# Patient Record
Sex: MALE | Race: WHITE | Employment: OTHER | URBAN - METROPOLITAN AREA
[De-identification: names, ages, dates, MRNs, and addresses within clinical notes are randomized per-mention and may not be internally consistent; named-entity substitution may affect disease eponyms.]

---

## 2023-08-22 ENCOUNTER — TELEPHONE (OUTPATIENT)
Dept: ORTHOPEDIC SURGERY | Age: 68
End: 2023-08-22

## 2023-08-22 ENCOUNTER — OFFICE VISIT (OUTPATIENT)
Dept: ORTHOPEDIC SURGERY | Age: 68
End: 2023-08-22
Payer: MEDICARE

## 2023-08-22 VITALS — WEIGHT: 247.8 LBS | HEIGHT: 74 IN | BODY MASS INDEX: 31.8 KG/M2

## 2023-08-22 DIAGNOSIS — M25.551 RIGHT HIP PAIN: Primary | ICD-10-CM

## 2023-08-22 PROCEDURE — G8417 CALC BMI ABV UP PARAM F/U: HCPCS | Performed by: ORTHOPAEDIC SURGERY

## 2023-08-22 PROCEDURE — 3017F COLORECTAL CA SCREEN DOC REV: CPT | Performed by: ORTHOPAEDIC SURGERY

## 2023-08-22 PROCEDURE — 99203 OFFICE O/P NEW LOW 30 MIN: CPT | Performed by: ORTHOPAEDIC SURGERY

## 2023-08-22 PROCEDURE — G8428 CUR MEDS NOT DOCUMENT: HCPCS | Performed by: ORTHOPAEDIC SURGERY

## 2023-08-22 PROCEDURE — 1123F ACP DISCUSS/DSCN MKR DOCD: CPT | Performed by: ORTHOPAEDIC SURGERY

## 2023-08-22 PROCEDURE — 4004F PT TOBACCO SCREEN RCVD TLK: CPT | Performed by: ORTHOPAEDIC SURGERY

## 2023-08-22 RX ORDER — MELOXICAM 15 MG/1
15 TABLET ORAL DAILY
Qty: 30 TABLET | Refills: 2 | Status: SHIPPED | OUTPATIENT
Start: 2023-08-22

## 2023-08-22 RX ORDER — OMEPRAZOLE 40 MG/1
40 CAPSULE, DELAYED RELEASE ORAL DAILY
Qty: 30 CAPSULE | Refills: 0 | Status: SHIPPED | OUTPATIENT
Start: 2023-08-22

## 2023-08-22 NOTE — TELEPHONE ENCOUNTER
She says there was no mention of sending in Omeprazole. Can you let them know why he needs that. She says he does not have any stomach issues.

## 2023-08-22 NOTE — PROGRESS NOTES
Patient ID:    Cedric Moncada  [id number]  34 y.o.  1955    Today: August 22, 2023          Chief Complaint:  right hip pain        Patient reports longstanding history of right hip pain. The patient reports predominately posterior buttock pain. Patient does not report significant anterior groin pain. In addition, the patient reports little if any problems in putting on socks/shoes and entering/exiting a vehicle. In addition, the patient reports little, if any, pain while laying on the hip. The patient does not report some associated numbness/tingling in the leg(s). Patient has attempted prior conservative treatment including OTC Aleve and tylenol. .        Past Medical History:  Past Medical History:   Diagnosis Date    Chest pain, unspecified     Gout     HTN (hypertension)     Sleep apnea        Past Surgical History:  Past Surgical History:   Procedure Laterality Date    CORONARY ANGIOPLASTY WITH STENT PLACEMENT      PROSTATECTOMY          Medications:     Prior to Admission medications    Medication Sig Start Date End Date Taking?  Authorizing Provider   meloxicam (MOBIC) 15 MG tablet Take 1 tablet by mouth daily 6/28/22  Yes Fran Owens MD   omeprazole (PRILOSEC) 40 MG delayed release capsule Take 1 capsule by mouth daily 6/28/22  Yes Fran Owens MD   amLODIPine (NORVASC) 2.5 MG tablet  4/12/22   Historical Provider, MD   ascorbic acid (VITAMIN C) 500 MG tablet Take by mouth    Ar Automatic Reconciliation   aspirin 81 MG EC tablet Take by mouth daily    Ar Automatic Reconciliation   atenolol (TENORMIN) 25 MG tablet Take 25 mg by mouth daily 12/15/16   Ar Automatic Reconciliation   atorvastatin (LIPITOR) 80 MG tablet Take 80 mg by mouth daily 2/23/18   Ar Automatic Reconciliation   Cetirizine HCl (ZYRTEC ALLERGY) 10 MG CAPS Take by mouth as needed    Ar Automatic Reconciliation   Cholecalciferol 50 MCG (2000 UT) TABS Take by mouth    Ar Automatic Reconciliation   coenzyme Q10 100 MG CAPS

## 2023-08-23 ENCOUNTER — TELEPHONE (OUTPATIENT)
Dept: ORTHOPEDIC SURGERY | Age: 68
End: 2023-08-23

## 2023-08-23 NOTE — TELEPHONE ENCOUNTER
His wife is calling regarding his visit yesterday. The medications that were pulled from his [facility] are not correct. Please call.

## 2023-08-25 ENCOUNTER — TELEPHONE (OUTPATIENT)
Dept: ORTHOPEDIC SURGERY | Age: 68
End: 2023-08-25

## 2023-08-25 NOTE — TELEPHONE ENCOUNTER
She left a message a few days ago and has not gotten a return call. She says there are some things on his record that are incorrect. She is asking again for a return call.

## 2023-08-28 ENCOUNTER — TELEPHONE (OUTPATIENT)
Dept: ORTHOPEDIC SURGERY | Age: 68
End: 2023-08-28

## 2023-08-31 ENCOUNTER — TELEPHONE (OUTPATIENT)
Dept: ORTHOPEDIC SURGERY | Age: 68
End: 2023-08-31

## 2023-08-31 NOTE — TELEPHONE ENCOUNTER
VM left on patient's phone to follow up with issues between medical records. Pt returned call about 2 minutes later and asked that I speak with his wife as she knows more about the situation. She will return the call later today.

## 2023-08-31 NOTE — TELEPHONE ENCOUNTER
Spoke with pt's wife, Izzy Patel, regarding issue with chart linking incorrectly. She stated that medications were listed that pt was not taking including: cholecalciferol, zyrtec, atorvastatin, atenolol, vitamin C, amlodipine and he did not have coronary angioplasty with stent placement nor prostatectomy surgeries. Explained that we will get corrections made and notify her when completed.  Maria R's phone #[phone]

## 2023-09-08 ENCOUNTER — TELEPHONE (OUTPATIENT)
Dept: ORTHOPEDIC SURGERY | Age: 68
End: 2023-09-08

## 2023-09-08 NOTE — TELEPHONE ENCOUNTER
Spoke with pt's wife, Eleanor Blend, re: Epic chart issues. Gave her the [facility] phone number to resolve any remaining issues. She stated that the inaccurate meds and surgical history is located on Dr. Leandra Estrada 8/22/23 note. Will submit to HIM/Privacy for correction.

## 2023-09-08 NOTE — TELEPHONE ENCOUNTER
Spoke with wife, Em Bose, to notify her that she will receive a form in the mail from HIM dept to request correction of Office Visit note dated 8/22/2023 per her request.

## 2024-06-13 ENCOUNTER — HOSPITAL ENCOUNTER (EMERGENCY)
Age: 69
Discharge: HOME | End: 2024-06-13
Payer: MEDICARE

## 2024-06-13 VITALS
DIASTOLIC BLOOD PRESSURE: 80 MMHG | OXYGEN SATURATION: 95 % | HEIGHT: 74 IN | BODY MASS INDEX: 31.44 KG/M2 | SYSTOLIC BLOOD PRESSURE: 150 MMHG | RESPIRATION RATE: 17 BRPM | HEART RATE: 91 BPM | WEIGHT: 245 LBS | TEMPERATURE: 98.5 F

## 2024-06-13 DIAGNOSIS — R04.0 EPISTAXIS: Primary | ICD-10-CM

## 2024-06-13 PROCEDURE — 99283 EMERGENCY DEPT VISIT LOW MDM: CPT

## 2024-06-13 PROCEDURE — 6370000000 HC RX 637 (ALT 250 FOR IP): Performed by: PHYSICIAN ASSISTANT

## 2024-06-13 RX ORDER — OXYMETAZOLINE HYDROCHLORIDE 0.05 G/100ML
2 SPRAY NASAL
Status: COMPLETED | OUTPATIENT
Start: 2024-06-13 | End: 2024-06-13

## 2024-06-13 RX ADMIN — OXYMETAZOLINE HCL 2 SPRAY: 0.05 SPRAY NASAL at 22:19

## 2024-06-13 ASSESSMENT — LIFESTYLE VARIABLES
HOW OFTEN DO YOU HAVE A DRINK CONTAINING ALCOHOL: NEVER
HOW MANY STANDARD DRINKS CONTAINING ALCOHOL DO YOU HAVE ON A TYPICAL DAY: PATIENT DOES NOT DRINK

## 2024-06-14 NOTE — ED NOTES
Patient mobility status  with no difficulty. Provider aware     I have reviewed discharge instructions with the patient.  The patient verbalized understanding.    Patient left ED via Discharge Method: ambulatory to Home with Spouse.    Opportunity for questions and clarification provided.     Patient given 0 scripts.           Reshma Obregon LPN  06/13/24 [id number]

## 2024-06-14 NOTE — ED TRIAGE NOTES
Here for nosebleed, started approx 1 hr pta. Pt takes xarelto. Bleeding appears controlled in triage. Denies posterior drainage. Currently experiencing cold-like sx, nasal congestion.

## 2024-06-14 NOTE — DISCHARGE INSTRUCTIONS
2 sprays in each nostril if the nosebleed returns.  Then placed a nasal clamp in place.  Follow-up with ear nose and throat.  I have sent in referral on your behalf.

## 2024-06-14 NOTE — ED PROVIDER NOTES
Emergency Department Provider Note       PCP: Candy Diaz MD   Age: 68 y.o.   Sex: male     DISPOSITION       No diagnosis found.  Medical Decision Making     Here with nosebleed.  Patient unfortunately was waiting in the waiting room for an hour and a half of nasal clamp.  I evaluated patient.  He no longer has any epistaxis.  I discussed at home treatments such as Afrin.  I will provide him with nasal clamp to go home.  Patient understands return precautions.     1 acute complicated illness or injury.  Over the counter drug management performed.  Patient was discharged risks and benefits of hospitalization were considered.  Shared medical decision making was utilized in creating the patients health plan today.    I independently ordered and reviewed each unique test.  I reviewed external records: ED visit note from an outside group.  I reviewed external records: provider visit note from outside specialist.  I reviewed external records: previous lab results from outside ED.      Voice dictation software was used during the making of this note.  This software is not perfect and grammatical and other typographical errors may be present.  This note has not been completely proofread for errors.    History     68 male here with complaints of nosebleed.  He reports he was mowing the yard today when the nosebleed began.  He takes Xarelto for history of DVTs.  He presented here for management as he cannot bernard the bleeding at home himself.  He also is experiencing some cold-like symptoms with nasal congestion, sinus congestion.  States he has not had significant nosebleeds in the past.    The history is provided by the patient. No  was used.     Physical Exam     Vitals signs and nursing note reviewed:  Vitals:    06/13/24 2044 06/13/24 2045   BP: (!) 158/83    Pulse: 94    Resp: 20    Temp: 98.5 °F (36.9 °C)    SpO2:  95%   Weight: 111.1 kg (245 lb)    Height: 1.88 m (6' 2\")

## 2024-07-17 ENCOUNTER — TELEPHONE (OUTPATIENT)
Dept: ORTHOPEDIC SURGERY | Age: 69
End: 2024-07-17

## 2024-07-17 NOTE — TELEPHONE ENCOUNTER
She says when her  saw Dr. Mitchell he mentioned referring him to a back doctor. They never heard from anyone. She wants to know if you can get him scheduled.

## 2024-08-02 ENCOUNTER — OFFICE VISIT (OUTPATIENT)
Dept: ORTHOPEDIC SURGERY | Age: 69
End: 2024-08-02

## 2024-08-02 VITALS — BODY MASS INDEX: 31.44 KG/M2 | WEIGHT: 245 LBS | HEIGHT: 74 IN

## 2024-08-02 DIAGNOSIS — M51.36 DDD (DEGENERATIVE DISC DISEASE), LUMBAR: ICD-10-CM

## 2024-08-02 DIAGNOSIS — M47.816 LUMBAR SPONDYLOSIS: Primary | ICD-10-CM

## 2024-08-02 DIAGNOSIS — M54.16 LUMBAR RADICULOPATHY: ICD-10-CM

## 2024-08-02 RX ORDER — DULOXETIN HYDROCHLORIDE 30 MG/1
30 CAPSULE, DELAYED RELEASE ORAL DAILY
Qty: 30 CAPSULE | Refills: 3 | Status: SHIPPED | OUTPATIENT
Start: 2024-08-02

## 2024-08-02 RX ORDER — CYCLOBENZAPRINE HCL 10 MG
10 TABLET ORAL EVERY 8 HOURS PRN
COMMUNITY
Start: 2022-07-11

## 2024-08-02 RX ORDER — ASPIRIN 81 MG/1
81 TABLET ORAL DAILY
COMMUNITY
Start: 2023-01-11

## 2024-08-02 RX ORDER — LISINOPRIL 2.5 MG/1
2.5 TABLET ORAL DAILY
COMMUNITY
Start: 2023-09-14

## 2024-08-02 RX ORDER — CALCIUM CITRATE/VITAMIN D3 200MG-6.25
TABLET ORAL
COMMUNITY
Start: 2024-05-04

## 2024-08-02 RX ORDER — SIMVASTATIN 20 MG
20 TABLET ORAL
COMMUNITY
Start: 2024-02-01

## 2024-08-02 RX ORDER — PRAMIPEXOLE DIHYDROCHLORIDE 1.5 MG/1
1.5 TABLET ORAL 2 TIMES DAILY
COMMUNITY
Start: 2023-09-14

## 2024-08-02 RX ORDER — DULAGLUTIDE 0.75 MG/.5ML
INJECTION, SOLUTION SUBCUTANEOUS
COMMUNITY

## 2024-08-02 RX ORDER — LANCETS 33 GAUGE
EACH MISCELLANEOUS
COMMUNITY
Start: 2024-05-04

## 2024-08-02 NOTE — PROGRESS NOTES
obtained.      FINDINGS:    Osseous structures:  There are 5 nonrib-bearing lumbar vertebral bodies; partial sacralization of the L5 vertebral body.. Vertebral body heights are maintained. Moderate-advanced multilevel loss of intervertebral disc space height. No spondylolisthesis. Bony ankylosis of the L4-5 vertebral bodies. Multilevel lumbar facet hypertrophy. Encroachment of the L3-4 and L4-5 neural foramina. Degenerative changes of the right hip.    Soft tissues:    Unremarkable.  Procedure Note    Fidel Noel MD - 06/26/2023  Formatting of this note might be different from the original.    EXAM:  XR SPINE LUMBAR 2 OR 3 VW    COMPARISON:  None.    INDICATION:  Right hip pain    TECHNICAL:  3 views of the lumbar spine were obtained.      FINDINGS:    Osseous structures:  There are 5 nonrib-bearing lumbar vertebral bodies; partial sacralization of the L5 vertebral body.. Vertebral body heights are maintained. Moderate-advanced multilevel loss of intervertebral disc space height. No spondylolisthesis. Bony ankylosis of the L4-5 vertebral bodies. Multilevel lumbar facet hypertrophy. Encroachment of the L3-4 and L4-5 neural foramina. Degenerative changes of the right hip.    Soft tissues:    Unremarkable.      IMPRESSION:    Moderate to advanced multilevel degenerative disc disease of the lumbar spine and associated lumbar facet hypertrophy resulting in encroachment of the L3-4 and L4-5 neural foramina.    Signed by: 6/26/2023 3:37 PM: Fidel Noel MD          ASSESSMENT AND PLAN:     The patient has lower back pain with pain to the right hip thigh and knee.  High suspicion of lumbar radiculopathy, suggestive of an L4 dermatomal pattern.  He does have multilevel degenerative changes to the lumbar spine noted on his previous x-rays at [facility] above.  We discussed options for management.  Will proceed with a course of physical therapy and a trial of Cymbalta.  He can continue over-the-counter NSAIDs or

## 2024-10-02 ENCOUNTER — OFFICE VISIT (OUTPATIENT)
Dept: ORTHOPEDIC SURGERY | Age: 69
End: 2024-10-02
Payer: MEDICARE

## 2024-10-02 VITALS — BODY MASS INDEX: 31.44 KG/M2 | WEIGHT: 245 LBS | HEIGHT: 74 IN

## 2024-10-02 DIAGNOSIS — M47.816 LUMBAR SPONDYLOSIS: ICD-10-CM

## 2024-10-02 DIAGNOSIS — M54.16 LUMBAR RADICULOPATHY: ICD-10-CM

## 2024-10-02 PROCEDURE — 1036F TOBACCO NON-USER: CPT | Performed by: PHYSICIAN ASSISTANT

## 2024-10-02 PROCEDURE — G8417 CALC BMI ABV UP PARAM F/U: HCPCS | Performed by: PHYSICIAN ASSISTANT

## 2024-10-02 PROCEDURE — 1123F ACP DISCUSS/DSCN MKR DOCD: CPT | Performed by: PHYSICIAN ASSISTANT

## 2024-10-02 PROCEDURE — 99214 OFFICE O/P EST MOD 30 MIN: CPT | Performed by: PHYSICIAN ASSISTANT

## 2024-10-02 PROCEDURE — G8484 FLU IMMUNIZE NO ADMIN: HCPCS | Performed by: PHYSICIAN ASSISTANT

## 2024-10-02 PROCEDURE — 3017F COLORECTAL CA SCREEN DOC REV: CPT | Performed by: PHYSICIAN ASSISTANT

## 2024-10-02 PROCEDURE — G8427 DOCREV CUR MEDS BY ELIG CLIN: HCPCS | Performed by: PHYSICIAN ASSISTANT

## 2024-10-02 RX ORDER — DULOXETIN HYDROCHLORIDE 30 MG/1
30 CAPSULE, DELAYED RELEASE ORAL DAILY
Qty: 90 CAPSULE | Refills: 3 | Status: SHIPPED | OUTPATIENT
Start: 2024-10-02

## 2024-10-02 NOTE — PROGRESS NOTES
10/02/24        Name: Arnulfo Wright  YOB: 1955  Gender: male  MRN: [id number]    CC: Follow-up       HPI: Arnulfo Wright is a 69 y.o. male who returns for Follow-up         Last saw the patient in the office 8/2/2024 with back pain going to the right hip and thigh and anterior knee.  History of previous spine surgery in the 1990s for which she had good resolution.  He has been taking Cymbalta which she says has provided him excellent improvement in his symptoms.  He is functioning very well not having only mild and occasional pain in his hip back and leg, he is scheduled to have a procedure for varicose veins done in the coming weeks.  He did not have to go to physical therapy, he got better with medication alone.    History was obtained by patient      Meds/PSH/PMH/FH/SH: This information has been reviewed.      ALLERGIES: No Known Allergies           Physical Examination:            Imaging:         MRI Result (most recent):  No results found for this or any previous visit from the past 3650 days.            Assessment and Plan    Patient is a good resolution with the Cymbalta.  We will start him on a home exercise program.  Plan to follow-up in 6 to 12 months for recheck of his symptoms or sooner as needed.  May consider further workup with lumbar MRI versus lumbar injection therapy in the future if his symptoms return or persist.      Clinical Notes:   I/my clinic will serve as the continuing focal point for all needed health care services and/or medical care services that are part of ongoing PAIN care related to patient's single, serious, or complex PAIN condition with the following diagnoses: Lumbar spondylosis   Chronic Pain Condition that is not stable = not at the patient's treatment goal

## 2025-03-21 ENCOUNTER — OFFICE VISIT (OUTPATIENT)
Dept: ORTHOPEDIC SURGERY | Age: 70
End: 2025-03-21
Payer: MEDICARE

## 2025-03-21 VITALS — WEIGHT: 245 LBS | BODY MASS INDEX: 31.44 KG/M2 | HEIGHT: 74 IN

## 2025-03-21 DIAGNOSIS — M54.16 LUMBAR RADICULOPATHY: ICD-10-CM

## 2025-03-21 DIAGNOSIS — M47.816 LUMBAR SPONDYLOSIS: Primary | ICD-10-CM

## 2025-03-21 PROCEDURE — G8417 CALC BMI ABV UP PARAM F/U: HCPCS | Performed by: PHYSICIAN ASSISTANT

## 2025-03-21 PROCEDURE — 1123F ACP DISCUSS/DSCN MKR DOCD: CPT | Performed by: PHYSICIAN ASSISTANT

## 2025-03-21 PROCEDURE — 99214 OFFICE O/P EST MOD 30 MIN: CPT | Performed by: PHYSICIAN ASSISTANT

## 2025-03-21 PROCEDURE — 1036F TOBACCO NON-USER: CPT | Performed by: PHYSICIAN ASSISTANT

## 2025-03-21 PROCEDURE — 1159F MED LIST DOCD IN RCRD: CPT | Performed by: PHYSICIAN ASSISTANT

## 2025-03-21 PROCEDURE — 3017F COLORECTAL CA SCREEN DOC REV: CPT | Performed by: PHYSICIAN ASSISTANT

## 2025-03-21 PROCEDURE — G8427 DOCREV CUR MEDS BY ELIG CLIN: HCPCS | Performed by: PHYSICIAN ASSISTANT

## 2025-03-21 PROCEDURE — G2211 COMPLEX E/M VISIT ADD ON: HCPCS | Performed by: PHYSICIAN ASSISTANT

## 2025-03-21 RX ORDER — DULAGLUTIDE 0.75 MG/.5ML
INJECTION, SOLUTION SUBCUTANEOUS
COMMUNITY
Start: 2025-03-03

## 2025-03-21 NOTE — PROGRESS NOTES
03/21/25        Name: Arnulfo Wright  YOB: 1955  Gender: male  MRN: [id number]    CC: Follow-up and Back Pain (Lumbar pain/)       HPI: Arnulfo Wright is a 69 y.o. male who returns for Follow-up and Back Pain (Lumbar pain/)         Patient returns the office today for follow-up.  Last saw the patient in the office in October 2024.  He had responded well to conservative treatment for back pain and right thigh and leg pain.  However over the last week he has had progressive pain in a different pattern.  He is now having pain that radiates from his lower back down his right hip and leg to the lateral ankle.  No injury or trauma at the onset.  Having pain and difficulty essentially at any position and almost constantly now.  It is limiting his activity and ability to do things around his home.  He does take Cymbalta.  Since I last saw him he was diagnosed with DVT and completed a few months course of Xarelto but is now off his blood thinner.  He is only taken a baby aspirin.    History was obtained by patient      Meds/PSH/PMH/FH/SH: This information has been reviewed.      ALLERGIES: No Known Allergies           Physical Examination:            Imaging:         MRI Result (most recent):  No results found for this or any previous visit from the past 3650 days.            Assessment and Plan    We discussed options for management.  The patient is in favor of lumbar injection therapy.  I did discuss the risks and benefits of the lumbar steroid injection therapy, including the risks to his blood sugar which is borderline controlled at this point.  His last A1c was 7.6 in January of this year.  He does note that his sugars have been running much better controlled recently.  I recommend a right L5 selective nerve root block with Dr. Victor and a lumbar MRI for further evaluation.  Plan to follow-up in 4 weeks to review the MRI results and his response to the injection.      Clinical Notes:   I/my clinic

## 2025-03-27 ENCOUNTER — PATIENT MESSAGE (OUTPATIENT)
Dept: ORTHOPEDIC SURGERY | Age: 70
End: 2025-03-27

## 2025-04-09 ENCOUNTER — OFFICE VISIT (OUTPATIENT)
Dept: ORTHOPEDIC SURGERY | Age: 70
End: 2025-04-09
Payer: MEDICARE

## 2025-04-09 DIAGNOSIS — M54.16 LUMBAR RADICULOPATHY: Primary | ICD-10-CM

## 2025-04-09 PROCEDURE — 64483 NJX AA&/STRD TFRM EPI L/S 1: CPT | Performed by: PHYSICAL MEDICINE & REHABILITATION

## 2025-04-09 RX ORDER — TRIAMCINOLONE ACETONIDE 40 MG/ML
40 INJECTION, SUSPENSION INTRA-ARTICULAR; INTRAMUSCULAR ONCE
Status: COMPLETED | OUTPATIENT
Start: 2025-04-09 | End: 2025-04-09

## 2025-04-09 RX ADMIN — TRIAMCINOLONE ACETONIDE 40 MG: 40 INJECTION, SUSPENSION INTRA-ARTICULAR; INTRAMUSCULAR at 08:28

## 2025-04-09 NOTE — PROGRESS NOTES
Date: 04/09/25   Name: Arnulfo Wright    Pre-Procedural Diagnosis:    Diagnosis Orders   1. Lumbar radiculopathy  FL NERVE BLOCK LUMBOSACRAL 1ST    triamcinolone acetonide (KENALOG-40) injection 40 mg          Procedure: Selective Nerve Root Blocks (Transforaminal) - Single Level    I have reviewed clinician's notes and orders placed., I have verbally confirmed side of symptomatology with patient., and I have personally reviewed with patient the informed consent for operation/procedure per [facility] protocol.  This involves risks and benefits of procedure, potential for success/improvement of injections,qualifications of physician performing procedure.  Consent form addressed appropriate local anesthesia.  This form was signed by all appropriate parties and scanned into the medical record. Note that is not appropriate for me to discuss spine surgical issues or other treatment options if I am not the primary clinician.  If I am the ordering clinician, those issues would have been discussed at the appropriate office visit or at upcoming encounter.     Reviewed plain lumbosacral spine films and fluoroscopic images today.  Patient appears have a partially sacralized L5 segment.  Injection placed accordingly.    Precautions: [facility] Precautions spine injections: None.  Patient denies any prior sensitivity to steroid, local anesthetic, contrast dye, iodine or shellfish.    The procedure was discussed at length with the patient and informed consent was signed. The patient was placed in a prone position on the fluoroscopy table and the skin was prepped and draped in a routine sterile fashion. The areas to be injected was/were anesthetized with approximately 5 cc of 1% Lidocaine. A 22-gauge 3.5 inch spinal needle was carefully advanced under fluoroscopic guidance to the right L5 transforaminal space(s). At this time 0.25 cc of omnipaque administered. Once proper placement was confirmed, 1 cc of 0.25% Marcaine and 80 mg of

## 2025-04-18 ENCOUNTER — OFFICE VISIT (OUTPATIENT)
Dept: ORTHOPEDIC SURGERY | Age: 70
End: 2025-04-18
Payer: MEDICARE

## 2025-04-18 VITALS — WEIGHT: 245 LBS | HEIGHT: 74 IN | BODY MASS INDEX: 31.44 KG/M2

## 2025-04-18 DIAGNOSIS — M48.062 SPINAL STENOSIS OF LUMBAR REGION WITH NEUROGENIC CLAUDICATION: Primary | ICD-10-CM

## 2025-04-18 DIAGNOSIS — M47.816 LUMBAR SPONDYLOSIS: ICD-10-CM

## 2025-04-18 PROCEDURE — 3017F COLORECTAL CA SCREEN DOC REV: CPT | Performed by: PHYSICIAN ASSISTANT

## 2025-04-18 PROCEDURE — 1036F TOBACCO NON-USER: CPT | Performed by: PHYSICIAN ASSISTANT

## 2025-04-18 PROCEDURE — 1123F ACP DISCUSS/DSCN MKR DOCD: CPT | Performed by: PHYSICIAN ASSISTANT

## 2025-04-18 PROCEDURE — G8417 CALC BMI ABV UP PARAM F/U: HCPCS | Performed by: PHYSICIAN ASSISTANT

## 2025-04-18 PROCEDURE — G8428 CUR MEDS NOT DOCUMENT: HCPCS | Performed by: PHYSICIAN ASSISTANT

## 2025-04-18 PROCEDURE — 99215 OFFICE O/P EST HI 40 MIN: CPT | Performed by: PHYSICIAN ASSISTANT

## 2025-04-18 NOTE — PROGRESS NOTES
04/18/25        Name: Arnulfo Wright  YOB: 1955  Gender: male  MRN: [id number]        MRI/IMAGING/STUDY FOLLOWUP    CC: Follow-up (MRI results)       HPI: Arnulfo Wright is a 69 y.o. male who returns for Follow-up (MRI results)           Patient presents to the office today for follow-up to review MRI results.  Patient recently underwent right L5 selective nerve root block with Dr. Victor.  He reports may be 40 to 50% improvement in his pain.  He also had an MRI done recently and is here to review the results.          Meds/PSH/PMH/FH/SH: This information has been reviewed.      ALLERGIES: No Known Allergies           Physical Examination:            Imaging:         MRI Result (most recent):  MRI LUMBAR SPINE WO CONTRAST 03/25/2025    Narrative  EXAMINATION: MRI LUMBAR SPINE 3/25/2025 7:50 AM    ACCESSION NUMBER: [id number]    INDICATION: Spondylosis without myelopathy or radiculopathy, lumbar region;  Radiculopathy, lumbar region low back and right leg pain    COMPARISON: Lumbar spine x-rays 3/21/2025    TECHNIQUE: Multisequence, multiplanar MR images were obtained of the lumbar  spine without the administration of intravenous contrast.    FINDINGS: For the purposes of this examination, there will be 5 lumbar-type  vertebral bodies with vertebral body numbering performed with the designation of  the last well formed intervertebral disc as L5-S1. There is transitional  lumbosacral segmentation, with sacralization of the vertebral body designated as  S1. If surgery or other intervention is planned, independent confirmation of  vertebral body numbering is recommended.    Levoconvex lower lumbar spine curvature. Degenerative endplate edema at L1-L2.  Multilevel chronic degenerative endplate remodeling.    No evidence of acute compression fracture or suspicious osseous lesion. L3 and  L4 hemangiomas.    The visualized portions of the distal spinal cord are of normal caliber and  signal

## 2025-04-24 ENCOUNTER — OFFICE VISIT (OUTPATIENT)
Age: 70
End: 2025-04-24
Payer: MEDICARE

## 2025-04-24 VITALS — WEIGHT: 244 LBS | BODY MASS INDEX: 31.32 KG/M2 | HEIGHT: 74 IN

## 2025-04-24 DIAGNOSIS — M47.816 LUMBAR SPONDYLOSIS: ICD-10-CM

## 2025-04-24 DIAGNOSIS — M48.062 SPINAL STENOSIS OF LUMBAR REGION WITH NEUROGENIC CLAUDICATION: ICD-10-CM

## 2025-04-24 DIAGNOSIS — M54.16 LUMBAR RADICULOPATHY: Primary | ICD-10-CM

## 2025-04-24 PROCEDURE — 1123F ACP DISCUSS/DSCN MKR DOCD: CPT | Performed by: ORTHOPAEDIC SURGERY

## 2025-04-24 PROCEDURE — 3017F COLORECTAL CA SCREEN DOC REV: CPT | Performed by: ORTHOPAEDIC SURGERY

## 2025-04-24 PROCEDURE — 1036F TOBACCO NON-USER: CPT | Performed by: ORTHOPAEDIC SURGERY

## 2025-04-24 PROCEDURE — G8428 CUR MEDS NOT DOCUMENT: HCPCS | Performed by: ORTHOPAEDIC SURGERY

## 2025-04-24 PROCEDURE — G8417 CALC BMI ABV UP PARAM F/U: HCPCS | Performed by: ORTHOPAEDIC SURGERY

## 2025-04-24 PROCEDURE — 99214 OFFICE O/P EST MOD 30 MIN: CPT | Performed by: ORTHOPAEDIC SURGERY

## 2025-04-25 ENCOUNTER — EVALUATION (OUTPATIENT)
Age: 70
End: 2025-04-25

## 2025-04-25 DIAGNOSIS — M54.16 LUMBAR RADICULOPATHY: ICD-10-CM

## 2025-04-25 DIAGNOSIS — M54.41 CHRONIC RIGHT-SIDED LOW BACK PAIN WITH RIGHT-SIDED SCIATICA: Primary | ICD-10-CM

## 2025-04-25 DIAGNOSIS — G89.29 CHRONIC RIGHT-SIDED LOW BACK PAIN WITH RIGHT-SIDED SCIATICA: Primary | ICD-10-CM

## 2025-04-25 DIAGNOSIS — R68.89 DECREASED ACTIVITY TOLERANCE: ICD-10-CM

## 2025-04-25 DIAGNOSIS — M47.816 LUMBAR SPONDYLOSIS: ICD-10-CM

## 2025-04-25 DIAGNOSIS — M62.81 MUSCLE WEAKNESS (GENERALIZED): ICD-10-CM

## 2025-04-25 DIAGNOSIS — M48.062 SPINAL STENOSIS OF LUMBAR REGION WITH NEUROGENIC CLAUDICATION: ICD-10-CM

## 2025-04-25 DIAGNOSIS — Z78.9 DECREASED ACTIVITIES OF DAILY LIVING (ADL): ICD-10-CM

## 2025-04-25 NOTE — PROGRESS NOTES
[facility]  [address] [place] [address]  Dept: [phone]      Physical Therapy Initial Assessment     Referring MD: Priyank Gray *  Diagnosis:     ICD-10-CM    1. Chronic right-sided low back pain with right-sided sciatica  M54.41     G89.29       2. Muscle weakness (generalized)  M62.81       3. Decreased activity tolerance  R68.89       4. Decreased activities of daily living (ADL)  Z78.9          Spine Surgery Date (if applicable):   Prior Spine Surgery: back surgery in 1996    Prior Orthopedic Surgeries:   Therapy precautions:None and Fall risk  Co-morbidities affecting plan of care: DM type 2    Payor: Payor: MEDICARE /  /  /     Billing pattern: Government- total time   Total Timed Procedure Codes: 30 min   Total Time: 40 min    Modifier needed: No  Episode visit count:  1     PERTINENT MEDICAL HISTORY   Medications: reviewed in chart  Past medical and surgical history:   Past Medical History:   Diagnosis Date    Diabetes mellitus (HCC)     DVT, lower extremity (HCC)       No past surgical history on file.  Allergies: No Known Allergies   Diagnostic exams (per chart review): Impression  1.  Lumbar spine degenerative changes are worst at L2-L3 and L3-L4, where there is severe spinal canal stenosis at both levels with associated cauda equina nerve root redundancy.  2.  Lumbar spine degenerative features are otherwise as fully detailed above.  3. Please see the first section of the report for discussion of variant vertebral body segmentation and utilized vertebral body numbering scheme.    SUBJECTIVE   Reports he has been hurting for years.     Patient Stated Goals: alleviate pain  Home program compliance:  will issue one today    Received previous outpatient therapy? Yes; 2 years ago for leg and back pain    Chief complaints/history of injury:  Date symptoms began: 1 year ago  Nature of condition: Chronic (continuous duration > 3 months)  Primary cause of

## 2025-04-28 NOTE — PROGRESS NOTES
Name: Arnulfo Wright  YOB: 1955  Gender: male  MRN: [id number]  Age: 69 y.o.    Chief Complaint:   Chief Complaint   Patient presents with    Established New Doctor     Lumbar radiculopathy      History of Present Illness  The patient is a 69-year-old male who presents for a review of an MRI of the lumbar spine from 03/25/2025. He was referred by Elan Cano. He is accompanied by his wife.    He reports experiencing pain that originates from his hip and radiates down his leg, exclusively on the right side. The pain, which he describes as severe, can manifest in various locations including his back, hip, thigh, knee, leg, and ankle, depending on the time of day. This discomfort is a constant presence in his daily life. He has been managing the pain with Aleve in the morning and Cymbalta. He also takes Flexeril. He does not experience any significant numbness or tingling. The pain is predominantly anterior. He has noticed significant pain around his ankle. His wife observes that he tends to lean towards his right side when walking. He underwent physical therapy approximately 2 years ago but has not sought this treatment recently. He received a steroid injection on 04/09/2025, which provided some relief but did not completely alleviate the pain. He has a history of L4-5 discectomy performed in 1996.    He has type 2 diabetes with the most recent hemoglobin A1c of 7.6 on 01/23/2025. He is due for a new A1c test soon. He is currently on metformin and Trulicity.    He has a history of varicose veins and blood clots, for which he has taken anticoagulants in the past, but he is not currently on any blood thinners. He reports no cardiac or pulmonary issues, attributing any health concerns to normal age-related changes. He has a history of sarcoidosis but is not currently receiving treatment for it.    PAST SURGICAL HISTORY:  L4-5 discectomy in 1996    SOCIAL HISTORY  Tobacco: Non-smoker.

## 2025-05-15 ENCOUNTER — OFFICE VISIT (OUTPATIENT)
Age: 70
End: 2025-05-15
Payer: MEDICARE

## 2025-05-15 DIAGNOSIS — M48.062 SPINAL STENOSIS OF LUMBAR REGION WITH NEUROGENIC CLAUDICATION: Primary | ICD-10-CM

## 2025-05-15 DIAGNOSIS — E11.9 TYPE 2 DIABETES MELLITUS WITHOUT COMPLICATION, UNSPECIFIED WHETHER LONG TERM INSULIN USE (HCC): ICD-10-CM

## 2025-05-15 DIAGNOSIS — M48.062 SPINAL STENOSIS OF LUMBAR REGION WITH NEUROGENIC CLAUDICATION: ICD-10-CM

## 2025-05-15 LAB
EST. AVERAGE GLUCOSE BLD GHB EST-MCNC: 179 MG/DL
HBA1C MFR BLD: 7.9 % (ref 0–5.6)

## 2025-05-15 PROCEDURE — 3051F HG A1C>EQUAL 7.0%<8.0%: CPT | Performed by: ORTHOPAEDIC SURGERY

## 2025-05-15 PROCEDURE — 1123F ACP DISCUSS/DSCN MKR DOCD: CPT | Performed by: ORTHOPAEDIC SURGERY

## 2025-05-15 PROCEDURE — 2022F DILAT RTA XM EVC RTNOPTHY: CPT | Performed by: ORTHOPAEDIC SURGERY

## 2025-05-15 PROCEDURE — G8417 CALC BMI ABV UP PARAM F/U: HCPCS | Performed by: ORTHOPAEDIC SURGERY

## 2025-05-15 PROCEDURE — G8428 CUR MEDS NOT DOCUMENT: HCPCS | Performed by: ORTHOPAEDIC SURGERY

## 2025-05-15 PROCEDURE — 1036F TOBACCO NON-USER: CPT | Performed by: ORTHOPAEDIC SURGERY

## 2025-05-15 PROCEDURE — 3017F COLORECTAL CA SCREEN DOC REV: CPT | Performed by: ORTHOPAEDIC SURGERY

## 2025-05-15 PROCEDURE — 99215 OFFICE O/P EST HI 40 MIN: CPT | Performed by: ORTHOPAEDIC SURGERY

## 2025-05-16 NOTE — PROGRESS NOTES
TO CHECK BLOOD SUGAR DAILY, Disp: , Rfl:     TRUE METRIX BLOOD GLUCOSE TEST strip, USE TO CHECK BLOOD SUGAR ONE TIME DAILY, Disp: , Rfl:     TRULICITY 0.75 MG/0.5ML SOPN SC injection, INJECT 0.5 ML (0.75 MG) UNDER THE SKIN ONCE A WEEK, Disp: , Rfl:     cyclobenzaprine (FLEXERIL) 10 MG tablet, Take 1 tablet by mouth every 8 hours as needed, Disp: , Rfl:     aspirin 81 MG EC tablet, Take 1 tablet by mouth daily, Disp: , Rfl:     meloxicam (MOBIC) 15 MG tablet, Take 1 tablet by mouth daily, Disp: 30 tablet, Rfl: 2    omeprazole (PRILOSEC) 40 MG delayed release capsule, Take 1 capsule by mouth daily, Disp: 30 capsule, Rfl: 0    Allergies:  No Known Allergies      Physical Exam:     The patient is well-developed and well nourished. Mood and cognition appear normal. Patient appears oriented to person/place/time.  and No evidence of increased work of breathing on exam.   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 Severe bilateral lateral recess stenosis at L3-4 secondary to facet hypertrophy and ligamentum flavum hypertrophy. Severe bilateral lateral recess stenosis at L2-3 secondary to ligamentum flavum hypertrophy. Mild to moderate left-sided L3 neuroforaminal stenosis. (ordered by another provider and then independently reviewed by myself)        Diagnosis:      ICD-10-CM    1. Spinal stenosis of lumbar region with neurogenic claudication  M48.062 Hemoglobin A1C      2. Type 2 diabetes mellitus without complication, unspecified whether long term insulin use ([facility])  E11.9 Hemoglobin A1C          Assessment & Plan  1. Right-sided radiculopathy:    A two-level laminectomy at L2-3 and L3-4 is proposed, with no plans for fusion.  Patient does have a previous hemilaminectomy at L4-5.  It is anticipated that his condition

## 2025-05-19 ENCOUNTER — TELEPHONE (OUTPATIENT)
Dept: ORTHOPEDIC SURGERY | Age: 70
End: 2025-05-19

## 2025-05-20 DIAGNOSIS — M54.16 LUMBAR RADICULOPATHY: Primary | ICD-10-CM

## 2025-05-20 DIAGNOSIS — M51.362 DEGENERATION OF INTERVERTEBRAL DISC OF LUMBAR REGION WITH DISCOGENIC BACK PAIN AND LOWER EXTREMITY PAIN: ICD-10-CM

## 2025-05-20 DIAGNOSIS — M48.062 SPINAL STENOSIS OF LUMBAR REGION WITH NEUROGENIC CLAUDICATION: ICD-10-CM

## 2025-05-20 DIAGNOSIS — M48.062 SPINAL STENOSIS, LUMBAR REGION, WITH NEUROGENIC CLAUDICATION: ICD-10-CM

## 2025-05-29 ENCOUNTER — OFFICE VISIT (OUTPATIENT)
Age: 70
End: 2025-05-29

## 2025-05-29 VITALS — WEIGHT: 242 LBS | BODY MASS INDEX: 31.06 KG/M2 | HEIGHT: 74 IN

## 2025-05-29 DIAGNOSIS — M54.16 LUMBAR RADICULOPATHY: ICD-10-CM

## 2025-05-29 DIAGNOSIS — M48.062 SPINAL STENOSIS OF LUMBAR REGION WITH NEUROGENIC CLAUDICATION: ICD-10-CM

## 2025-05-29 DIAGNOSIS — M54.10 RADICULAR PAIN OF RIGHT LOWER EXTREMITY: Primary | ICD-10-CM

## 2025-06-01 NOTE — H&P (VIEW-ONLY)
Name: Arnulfo Wright  YOB: 1955  Gender: male  MRN: [id number]  Age: 69 y.o.    Chief Complaint: Lumbar radiculopathy, right lower extremity pain    Assessment/Plan at Previous Visit: Further discussion of surgery, attempts at physical therapy    History of Present Illness  The patient is a 69-year-old male who presents for evaluation of severe right lower extremity radiculopathy and lumbar stenosis. He was last seen on 05/15/2025. An L2-L3 and L3-L4 laminectomy was discussed at the last visit, but there was concern regarding glycemic control, leading to the order of an A1c test. He had an epidural steroid injection on 04/09/2025 and has tried physical therapy.    He reports that his blood glucose levels have been consistently below 120 for the past few days, with readings of 117 this morning, 100 the day before, and 97 prior to that. He monitors his blood glucose levels daily upon waking. His diabetic medication regimen has remained unchanged over the past few months.     Pain continues to radiate from the right buttock down to the ankle.  He continues to have debilitating pain                   Medications:       Current Outpatient Medications:     TRULICITY 0.75 MG/0.5ML SOAJ SC injection, INJECT 0.5 ML (0.75 MG) UNDER THE SKIN ONE TIME PER WEEK, Disp: , Rfl:     DULoxetine (CYMBALTA) 30 MG extended release capsule, Take 1 capsule by mouth daily, Disp: 90 capsule, Rfl: 3    simvastatin (ZOCOR) 20 MG tablet, Take 1 tablet by mouth, Disp: , Rfl:     pramipexole (MIRAPEX) 1.5 MG tablet, Take 1 tablet by mouth 2 times daily, Disp: , Rfl:     metFORMIN (GLUCOPHAGE) 1000 MG tablet, Take 1 tablet by mouth 2 times daily, Disp: , Rfl:     lisinopril (PRINIVIL;ZESTRIL) 2.5 MG tablet, Take 1 tablet by mouth daily, Disp: , Rfl:     PX Lancets MicroThin 33G MISC, USE TO CHECK BLOOD SUGAR DAILY, Disp: , Rfl:     TRUE METRIX BLOOD GLUCOSE TEST strip, USE TO CHECK BLOOD SUGAR ONE TIME DAILY, Disp: , Rfl:

## 2025-06-09 ENCOUNTER — HOSPITAL ENCOUNTER (OUTPATIENT)
Dept: SURGERY | Age: 70
Discharge: HOME | End: 2025-06-12
Payer: MEDICARE

## 2025-06-09 VITALS
DIASTOLIC BLOOD PRESSURE: 61 MMHG | HEIGHT: 74 IN | OXYGEN SATURATION: 98 % | BODY MASS INDEX: 31.44 KG/M2 | RESPIRATION RATE: 16 BRPM | TEMPERATURE: 97.5 F | HEART RATE: 68 BPM | SYSTOLIC BLOOD PRESSURE: 139 MMHG | WEIGHT: 245 LBS

## 2025-06-09 LAB
EKG ATRIAL RATE: 68 BPM
EKG DIAGNOSIS: NORMAL
EKG P AXIS: 61 DEGREES
EKG P-R INTERVAL: 180 MS
EKG Q-T INTERVAL: 432 MS
EKG QRS DURATION: 132 MS
EKG QTC CALCULATION (BAZETT): 459 MS
EKG R AXIS: 68 DEGREES
EKG T AXIS: 38 DEGREES
EKG VENTRICULAR RATE: 68 BPM
EST. AVERAGE GLUCOSE BLD GHB EST-MCNC: 161 MG/DL
HBA1C MFR BLD: 7.3 % (ref 0–5.6)
HGB BLD-MCNC: 13.2 G/DL (ref 13.6–17.2)
INR PPP: 1
PROTHROMBIN TIME: 13.5 SEC (ref 11.3–14.9)

## 2025-06-09 PROCEDURE — 93005 ELECTROCARDIOGRAM TRACING: CPT | Performed by: ANESTHESIOLOGY

## 2025-06-09 PROCEDURE — 87641 MR-STAPH DNA AMP PROBE: CPT

## 2025-06-09 PROCEDURE — 83036 HEMOGLOBIN GLYCOSYLATED A1C: CPT

## 2025-06-09 PROCEDURE — 93010 ELECTROCARDIOGRAM REPORT: CPT | Performed by: INTERNAL MEDICINE

## 2025-06-09 PROCEDURE — 85610 PROTHROMBIN TIME: CPT

## 2025-06-09 PROCEDURE — 85018 HEMOGLOBIN: CPT

## 2025-06-09 RX ORDER — SENNOSIDES 8.6 MG
650 CAPSULE ORAL EVERY 8 HOURS PRN
COMMUNITY

## 2025-06-09 RX ORDER — COVID-19 ANTIGEN TEST
220 KIT MISCELLANEOUS AS NEEDED
COMMUNITY

## 2025-06-09 ASSESSMENT — PAIN SCALES - GENERAL: PAINLEVEL_OUTOF10: 3

## 2025-06-09 ASSESSMENT — PAIN DESCRIPTION - ORIENTATION: ORIENTATION: POSTERIOR;LOWER

## 2025-06-09 ASSESSMENT — PAIN DESCRIPTION - DESCRIPTORS: DESCRIPTORS: DULL

## 2025-06-09 ASSESSMENT — PAIN DESCRIPTION - LOCATION: LOCATION: BACK

## 2025-06-09 NOTE — PERIOP NOTE
Patient verified name and     Order for consent NOT found in EHR at time of PAT visit. Unable to verify case posting against order; surgery verified by patient.      Type 2 surgery, walk-in assessment complete.    Labs per surgeon: PT/INR, MRSA, A1c collected and resulted in [facility]  Labs per anesthesia protocol: Hgb collected and resulted in Epic  EKG: EKG collected 2025 and resulted in [facility]        Patient provided with and instructed on educational handouts including Guide to Surgery, Preventing Surgical Site Infections, Pain Management, and [facility] Brochure.    Patient answered medical/surgical history questions at their best of ability. All prior to admission medications documented in EPIC. Original medication prescription bottle was visualized during patient appointment.     Patient instructed to hold all vitamins 7 days prior to surgery and NSAIDS 5 days prior to surgery, patient verbalized understanding.     Patient teach back successful and patient demonstrates knowledge of instructions.     PLEASE CONTINUE TAKING ALL PRESCRIPTION MEDICATIONS UP TO THE DAY OF SURGERY UNLESS OTHERWISE DIRECTED BELOW. You may take Tylenol, allergy,  and/or indigestion medications.     TAKE ONLY THESE MEDICATIONS ON THE DAY OF SURGERY   Omeprazole (Prilosec)  pramipexole(Mirapex)     Duloxetine (Cymbalta)      Metformin (Glucophage)       DISCONTINUE all vitamins and supplements now. DISCONTINUE Non-Steroidal Anti-Inflammatory (NSAIDS) such as Advil and Aleve 5 days prior to surgery.     Home Medications to Hold- please continue all other medications except these.    Aspirin 81 mg - hold 5 days prior to surgery   Naproxen sodium - hold 5 days prior to surgery  Cyclobenzaprine (Flexeril) - hold day of surgery       Comments   BHUPINDER-HEX shower the night before surgery and the morning of surgery.     On the day before surgery () please take 2 Tylenol in the morning and then again before bed. You may use

## 2025-06-10 LAB
MRSA DNA SPEC QL NAA+PROBE: NOT DETECTED
S AUREUS CPE NOSE QL NAA+PROBE: NOT DETECTED

## 2025-06-14 ENCOUNTER — TELEPHONE (OUTPATIENT)
Age: 70
End: 2025-06-14

## 2025-06-14 NOTE — TELEPHONE ENCOUNTER
Spoke to  And Mrs. Wright over the phone. I also spoke to Dr. Diaz, the patient's PCP yesterday afternoon. Pre op labs included a repeat Hb A1c of 7.3, from June 9th 2025. This is decreased from 7.9 on May 15th 2025.     Patient sent over daily blood glucose logs that he has been keeping over from 5/29 to 6/9. Patient tested his BGL in the AM and PM on these days with the majority of readings 120, or less (nothing above 135, cumulative daily average of 115).     Discussed with the patient my conversation with PCP yesterday who felt that there were no objections from her standpoint in proceeding with surgery. She offered/advised having the patient come in within the first week of post op to ensure continued tight glycemic control which could +/- include initiation of insulin (at least in the post operative period).      Considering the improvement in A1c, as well as patient documented twice daily BGL logs within acceptable range, we agreed it was reasonable to proceed with the surgical plan as previously documented. Patient continues to have severe lower extremity radiculopathy. He understands the risks and benefits of surgery, particularly in regard to the DM and wound/infection risks.     Told the patient we would look forward to seeing him Monday morning for surgery. All questions answered.

## 2025-06-15 ENCOUNTER — ANESTHESIA EVENT (OUTPATIENT)
Dept: SURGERY | Age: 70
DRG: 519 | End: 2025-06-15
Payer: MEDICARE

## 2025-06-16 ENCOUNTER — APPOINTMENT (OUTPATIENT)
Dept: GENERAL RADIOLOGY | Age: 70
DRG: 519 | End: 2025-06-16
Attending: ORTHOPAEDIC SURGERY
Payer: MEDICARE

## 2025-06-16 ENCOUNTER — HOSPITAL ENCOUNTER (INPATIENT)
Age: 70
LOS: 1 days | Discharge: HOME HEALTH | DRG: 519 | End: 2025-06-19
Attending: ORTHOPAEDIC SURGERY | Admitting: ORTHOPAEDIC SURGERY
Payer: MEDICARE

## 2025-06-16 ENCOUNTER — ANESTHESIA (OUTPATIENT)
Dept: SURGERY | Age: 70
DRG: 519 | End: 2025-06-16
Payer: MEDICARE

## 2025-06-16 DIAGNOSIS — M48.062 LUMBAR STENOSIS WITH NEUROGENIC CLAUDICATION: Primary | ICD-10-CM

## 2025-06-16 LAB
ALBUMIN SERPL-MCNC: 3.7 G/DL (ref 3.2–4.6)
ALBUMIN/GLOB SERPL: 1.3 (ref 1–1.9)
ALP SERPL-CCNC: 56 U/L (ref 40–129)
ALT SERPL-CCNC: 43 U/L (ref 8–55)
ANION GAP SERPL CALC-SCNC: 12 MMOL/L (ref 7–16)
AST SERPL-CCNC: 39 U/L (ref 15–37)
BILIRUB SERPL-MCNC: 0.4 MG/DL (ref 0–1.2)
BUN SERPL-MCNC: 13 MG/DL (ref 8–23)
CALCIUM SERPL-MCNC: 8.9 MG/DL (ref 8.8–10.2)
CHLORIDE SERPL-SCNC: 105 MMOL/L (ref 98–107)
CO2 SERPL-SCNC: 23 MMOL/L (ref 20–29)
CREAT SERPL-MCNC: 0.78 MG/DL (ref 0.8–1.3)
ERYTHROCYTE [DISTWIDTH] IN BLOOD BY AUTOMATED COUNT: 14.4 % (ref 11.9–14.6)
GLOBULIN SER CALC-MCNC: 2.8 G/DL (ref 2.3–3.5)
GLUCOSE BLD STRIP.AUTO-MCNC: 120 MG/DL (ref 65–100)
GLUCOSE BLD STRIP.AUTO-MCNC: 121 MG/DL (ref 65–100)
GLUCOSE BLD STRIP.AUTO-MCNC: 155 MG/DL (ref 65–100)
GLUCOSE BLD STRIP.AUTO-MCNC: 157 MG/DL (ref 65–100)
GLUCOSE BLD STRIP.AUTO-MCNC: 195 MG/DL (ref 65–100)
GLUCOSE SERPL-MCNC: 180 MG/DL (ref 70–99)
HCT VFR BLD AUTO: 39.5 % (ref 41.1–50.3)
HGB BLD-MCNC: 13 G/DL (ref 13.6–17.2)
MCH RBC QN AUTO: 29.5 PG (ref 26.1–32.9)
MCHC RBC AUTO-ENTMCNC: 32.9 G/DL (ref 31.4–35)
MCV RBC AUTO: 89.8 FL (ref 82–102)
NRBC # BLD: 0 K/UL (ref 0–0.2)
PLATELET # BLD AUTO: 160 K/UL (ref 150–450)
PMV BLD AUTO: 10 FL (ref 9.4–12.3)
POTASSIUM SERPL-SCNC: 4.4 MMOL/L (ref 3.5–5.1)
PROT SERPL-MCNC: 6.4 G/DL (ref 6.3–8.2)
RBC # BLD AUTO: 4.4 M/UL (ref 4.23–5.6)
SERVICE CMNT-IMP: ABNORMAL
SODIUM SERPL-SCNC: 140 MMOL/L (ref 136–145)
WBC # BLD AUTO: 5.6 K/UL (ref 4.3–11.1)

## 2025-06-16 PROCEDURE — 3600000006 HC SURGERY LEVEL 6 BASE: Performed by: ORTHOPAEDIC SURGERY

## 2025-06-16 PROCEDURE — 6370000000 HC RX 637 (ALT 250 FOR IP): Performed by: ORTHOPAEDIC SURGERY

## 2025-06-16 PROCEDURE — 51701 INSERT BLADDER CATHETER: CPT

## 2025-06-16 PROCEDURE — 2580000003 HC RX 258: Performed by: ORTHOPAEDIC SURGERY

## 2025-06-16 PROCEDURE — 2580000003 HC RX 258: Performed by: ANESTHESIOLOGY

## 2025-06-16 PROCEDURE — 6360000002 HC RX W HCPCS

## 2025-06-16 PROCEDURE — 82962 GLUCOSE BLOOD TEST: CPT

## 2025-06-16 PROCEDURE — 51798 US URINE CAPACITY MEASURE: CPT

## 2025-06-16 PROCEDURE — 2709999900 HC NON-CHARGEABLE SUPPLY: Performed by: ORTHOPAEDIC SURGERY

## 2025-06-16 PROCEDURE — C1763 CONN TISS, NON-HUMAN: HCPCS | Performed by: ORTHOPAEDIC SURGERY

## 2025-06-16 PROCEDURE — 3700000001 HC ADD 15 MINUTES (ANESTHESIA): Performed by: ORTHOPAEDIC SURGERY

## 2025-06-16 PROCEDURE — 80053 COMPREHEN METABOLIC PANEL: CPT

## 2025-06-16 PROCEDURE — 7100000001 HC PACU RECOVERY - ADDTL 15 MIN: Performed by: ORTHOPAEDIC SURGERY

## 2025-06-16 PROCEDURE — 6360000002 HC RX W HCPCS: Performed by: ORTHOPAEDIC SURGERY

## 2025-06-16 PROCEDURE — 36415 COLL VENOUS BLD VENIPUNCTURE: CPT

## 2025-06-16 PROCEDURE — 2500000003 HC RX 250 WO HCPCS: Performed by: ORTHOPAEDIC SURGERY

## 2025-06-16 PROCEDURE — G0378 HOSPITAL OBSERVATION PER HR: HCPCS

## 2025-06-16 PROCEDURE — 00NY0ZZ RELEASE LUMBAR SPINAL CORD, OPEN APPROACH: ICD-10-PCS | Performed by: ORTHOPAEDIC SURGERY

## 2025-06-16 PROCEDURE — 2720000010 HC SURG SUPPLY STERILE: Performed by: ORTHOPAEDIC SURGERY

## 2025-06-16 PROCEDURE — 85027 COMPLETE CBC AUTOMATED: CPT

## 2025-06-16 PROCEDURE — 00U20KZ SUPPLEMENT DURA MATER WITH NONAUTOLOGOUS TISSUE SUBSTITUTE, OPEN APPROACH: ICD-10-PCS | Performed by: ORTHOPAEDIC SURGERY

## 2025-06-16 PROCEDURE — 2500000003 HC RX 250 WO HCPCS

## 2025-06-16 PROCEDURE — 3700000000 HC ANESTHESIA ATTENDED CARE: Performed by: ORTHOPAEDIC SURGERY

## 2025-06-16 PROCEDURE — 7100000000 HC PACU RECOVERY - FIRST 15 MIN: Performed by: ORTHOPAEDIC SURGERY

## 2025-06-16 PROCEDURE — 3600000016 HC SURGERY LEVEL 6 ADDTL 15MIN: Performed by: ORTHOPAEDIC SURGERY

## 2025-06-16 PROCEDURE — 6370000000 HC RX 637 (ALT 250 FOR IP): Performed by: ANESTHESIOLOGY

## 2025-06-16 DEVICE — DURAGEN® DURAL GRAFT MATRIX 1PK, 2X2 DOM
Type: IMPLANTABLE DEVICE | Site: SPINE LUMBAR | Status: FUNCTIONAL
Brand: DURAGEN®

## 2025-06-16 DEVICE — DURASEAL® DURAL SEALANT SYSTEM 5ML 5 PACK
Type: IMPLANTABLE DEVICE | Site: SPINE LUMBAR | Status: FUNCTIONAL
Brand: DURASEAL®

## 2025-06-16 RX ORDER — ACETAMINOPHEN 325 MG/1
650 TABLET ORAL EVERY 6 HOURS
Status: DISCONTINUED | OUTPATIENT
Start: 2025-06-16 | End: 2025-06-19 | Stop reason: HOSPADM

## 2025-06-16 RX ORDER — CYCLOBENZAPRINE HCL 10 MG
10 TABLET ORAL 3 TIMES DAILY PRN
Status: DISCONTINUED | OUTPATIENT
Start: 2025-06-16 | End: 2025-06-19 | Stop reason: HOSPADM

## 2025-06-16 RX ORDER — PROPOFOL 10 MG/ML
INJECTION, EMULSION INTRAVENOUS
Status: DISCONTINUED | OUTPATIENT
Start: 2025-06-16 | End: 2025-06-16 | Stop reason: SDUPTHER

## 2025-06-16 RX ORDER — OXYCODONE HYDROCHLORIDE 5 MG/1
10 TABLET ORAL PRN
Status: DISCONTINUED | OUTPATIENT
Start: 2025-06-16 | End: 2025-06-16 | Stop reason: HOSPADM

## 2025-06-16 RX ORDER — ONDANSETRON 2 MG/ML
4 INJECTION INTRAMUSCULAR; INTRAVENOUS
Status: DISCONTINUED | OUTPATIENT
Start: 2025-06-16 | End: 2025-06-16 | Stop reason: HOSPADM

## 2025-06-16 RX ORDER — DIPHENHYDRAMINE HYDROCHLORIDE 50 MG/ML
12.5 INJECTION, SOLUTION INTRAMUSCULAR; INTRAVENOUS
Status: DISCONTINUED | OUTPATIENT
Start: 2025-06-16 | End: 2025-06-16 | Stop reason: HOSPADM

## 2025-06-16 RX ORDER — DEXTROSE MONOHYDRATE 100 MG/ML
INJECTION, SOLUTION INTRAVENOUS CONTINUOUS PRN
Status: DISCONTINUED | OUTPATIENT
Start: 2025-06-16 | End: 2025-06-19 | Stop reason: HOSPADM

## 2025-06-16 RX ORDER — ROCURONIUM BROMIDE 10 MG/ML
INJECTION, SOLUTION INTRAVENOUS
Status: DISCONTINUED | OUTPATIENT
Start: 2025-06-16 | End: 2025-06-16 | Stop reason: SDUPTHER

## 2025-06-16 RX ORDER — VANCOMYCIN HYDROCHLORIDE 1 G/20ML
INJECTION, POWDER, LYOPHILIZED, FOR SOLUTION INTRAVENOUS PRN
Status: DISCONTINUED | OUTPATIENT
Start: 2025-06-16 | End: 2025-06-16 | Stop reason: ALTCHOICE

## 2025-06-16 RX ORDER — ENOXAPARIN SODIUM 100 MG/ML
30 INJECTION SUBCUTANEOUS 2 TIMES DAILY
Status: DISCONTINUED | OUTPATIENT
Start: 2025-06-17 | End: 2025-06-19 | Stop reason: HOSPADM

## 2025-06-16 RX ORDER — INSULIN GLARGINE 100 [IU]/ML
0.25 INJECTION, SOLUTION SUBCUTANEOUS DAILY
Status: DISCONTINUED | OUTPATIENT
Start: 2025-06-17 | End: 2025-06-17 | Stop reason: SDUPTHER

## 2025-06-16 RX ORDER — ENOXAPARIN SODIUM 100 MG/ML
40 INJECTION SUBCUTANEOUS DAILY
Status: DISCONTINUED | OUTPATIENT
Start: 2025-06-17 | End: 2025-06-16

## 2025-06-16 RX ORDER — DIPHENHYDRAMINE HCL 25 MG
25 CAPSULE ORAL EVERY 6 HOURS PRN
Status: DISCONTINUED | OUTPATIENT
Start: 2025-06-16 | End: 2025-06-19 | Stop reason: HOSPADM

## 2025-06-16 RX ORDER — IBUPROFEN 600 MG/1
1 TABLET ORAL PRN
Status: DISCONTINUED | OUTPATIENT
Start: 2025-06-16 | End: 2025-06-19 | Stop reason: HOSPADM

## 2025-06-16 RX ORDER — SODIUM CHLORIDE 9 MG/ML
INJECTION, SOLUTION INTRAVENOUS CONTINUOUS
Status: ACTIVE | OUTPATIENT
Start: 2025-06-16 | End: 2025-06-16

## 2025-06-16 RX ORDER — DEXAMETHASONE SODIUM PHOSPHATE 10 MG/ML
INJECTION, SOLUTION INTRA-ARTICULAR; INTRALESIONAL; INTRAMUSCULAR; INTRAVENOUS; SOFT TISSUE
Status: DISCONTINUED | OUTPATIENT
Start: 2025-06-16 | End: 2025-06-16 | Stop reason: SDUPTHER

## 2025-06-16 RX ORDER — BUPIVACAINE HYDROCHLORIDE AND EPINEPHRINE 5; 5 MG/ML; UG/ML
INJECTION, SOLUTION EPIDURAL; INTRACAUDAL; PERINEURAL PRN
Status: DISCONTINUED | OUTPATIENT
Start: 2025-06-16 | End: 2025-06-16 | Stop reason: ALTCHOICE

## 2025-06-16 RX ORDER — HYDROMORPHONE HYDROCHLORIDE 1 MG/ML
0.5 INJECTION, SOLUTION INTRAMUSCULAR; INTRAVENOUS; SUBCUTANEOUS
Status: DISCONTINUED | OUTPATIENT
Start: 2025-06-16 | End: 2025-06-19 | Stop reason: HOSPADM

## 2025-06-16 RX ORDER — NALOXONE HYDROCHLORIDE 0.4 MG/ML
INJECTION, SOLUTION INTRAMUSCULAR; INTRAVENOUS; SUBCUTANEOUS PRN
Status: DISCONTINUED | OUTPATIENT
Start: 2025-06-16 | End: 2025-06-16 | Stop reason: HOSPADM

## 2025-06-16 RX ORDER — SODIUM CHLORIDE, SODIUM LACTATE, POTASSIUM CHLORIDE, CALCIUM CHLORIDE 600; 310; 30; 20 MG/100ML; MG/100ML; MG/100ML; MG/100ML
INJECTION, SOLUTION INTRAVENOUS CONTINUOUS
Status: DISCONTINUED | OUTPATIENT
Start: 2025-06-16 | End: 2025-06-16 | Stop reason: HOSPADM

## 2025-06-16 RX ORDER — MIDAZOLAM HYDROCHLORIDE 2 MG/2ML
2 INJECTION, SOLUTION INTRAMUSCULAR; INTRAVENOUS
Status: DISCONTINUED | OUTPATIENT
Start: 2025-06-16 | End: 2025-06-16 | Stop reason: HOSPADM

## 2025-06-16 RX ORDER — ATORVASTATIN CALCIUM 20 MG/1
20 TABLET, FILM COATED ORAL
Status: DISCONTINUED | OUTPATIENT
Start: 2025-06-16 | End: 2025-06-19 | Stop reason: HOSPADM

## 2025-06-16 RX ORDER — POLYETHYLENE GLYCOL 3350 17 G/17G
17 POWDER, FOR SOLUTION ORAL DAILY
Status: DISCONTINUED | OUTPATIENT
Start: 2025-06-16 | End: 2025-06-19 | Stop reason: HOSPADM

## 2025-06-16 RX ORDER — LISINOPRIL 5 MG/1
2.5 TABLET ORAL EVERY EVENING
Status: DISCONTINUED | OUTPATIENT
Start: 2025-06-16 | End: 2025-06-19 | Stop reason: HOSPADM

## 2025-06-16 RX ORDER — TRANEXAMIC ACID 100 MG/ML
INJECTION, SOLUTION INTRAVENOUS
Status: DISCONTINUED | OUTPATIENT
Start: 2025-06-16 | End: 2025-06-16 | Stop reason: SDUPTHER

## 2025-06-16 RX ORDER — BISACODYL 5 MG/1
5 TABLET, DELAYED RELEASE ORAL DAILY
Status: DISCONTINUED | OUTPATIENT
Start: 2025-06-16 | End: 2025-06-19 | Stop reason: HOSPADM

## 2025-06-16 RX ORDER — KETAMINE HCL IN NACL, ISO-OSM 20 MG/2 ML
SYRINGE (ML) INJECTION
Status: DISCONTINUED | OUTPATIENT
Start: 2025-06-16 | End: 2025-06-16 | Stop reason: SDUPTHER

## 2025-06-16 RX ORDER — SODIUM CHLORIDE 0.9 % (FLUSH) 0.9 %
5-40 SYRINGE (ML) INJECTION PRN
Status: DISCONTINUED | OUTPATIENT
Start: 2025-06-16 | End: 2025-06-16 | Stop reason: HOSPADM

## 2025-06-16 RX ORDER — SODIUM CHLORIDE 9 MG/ML
INJECTION, SOLUTION INTRAVENOUS PRN
Status: DISCONTINUED | OUTPATIENT
Start: 2025-06-16 | End: 2025-06-16 | Stop reason: HOSPADM

## 2025-06-16 RX ORDER — ONDANSETRON 2 MG/ML
4 INJECTION INTRAMUSCULAR; INTRAVENOUS EVERY 6 HOURS PRN
Status: DISCONTINUED | OUTPATIENT
Start: 2025-06-16 | End: 2025-06-19 | Stop reason: HOSPADM

## 2025-06-16 RX ORDER — SODIUM CHLORIDE 0.9 % (FLUSH) 0.9 %
5-40 SYRINGE (ML) INJECTION PRN
Status: DISCONTINUED | OUTPATIENT
Start: 2025-06-16 | End: 2025-06-19 | Stop reason: HOSPADM

## 2025-06-16 RX ORDER — ACETAMINOPHEN 500 MG
1000 TABLET ORAL ONCE
Status: COMPLETED | OUTPATIENT
Start: 2025-06-16 | End: 2025-06-16

## 2025-06-16 RX ORDER — LIDOCAINE HYDROCHLORIDE 10 MG/ML
1 INJECTION, SOLUTION INFILTRATION; PERINEURAL
Status: DISCONTINUED | OUTPATIENT
Start: 2025-06-16 | End: 2025-06-16 | Stop reason: HOSPADM

## 2025-06-16 RX ORDER — SODIUM CHLORIDE 0.9 % (FLUSH) 0.9 %
5-40 SYRINGE (ML) INJECTION EVERY 12 HOURS SCHEDULED
Status: DISCONTINUED | OUTPATIENT
Start: 2025-06-16 | End: 2025-06-16 | Stop reason: HOSPADM

## 2025-06-16 RX ORDER — INSULIN LISPRO 100 [IU]/ML
0-8 INJECTION, SOLUTION INTRAVENOUS; SUBCUTANEOUS
Status: DISCONTINUED | OUTPATIENT
Start: 2025-06-16 | End: 2025-06-19 | Stop reason: HOSPADM

## 2025-06-16 RX ORDER — KETOROLAC TROMETHAMINE 15 MG/ML
15 INJECTION, SOLUTION INTRAMUSCULAR; INTRAVENOUS EVERY 6 HOURS
Status: COMPLETED | OUTPATIENT
Start: 2025-06-16 | End: 2025-06-17

## 2025-06-16 RX ORDER — LIDOCAINE HYDROCHLORIDE 20 MG/ML
INJECTION, SOLUTION EPIDURAL; INFILTRATION; INTRACAUDAL; PERINEURAL
Status: DISCONTINUED | OUTPATIENT
Start: 2025-06-16 | End: 2025-06-16 | Stop reason: SDUPTHER

## 2025-06-16 RX ORDER — NEOSTIGMINE METHYLSULFATE 1 MG/ML
INJECTION INTRAVENOUS
Status: DISCONTINUED | OUTPATIENT
Start: 2025-06-16 | End: 2025-06-16 | Stop reason: SDUPTHER

## 2025-06-16 RX ORDER — OXYCODONE HYDROCHLORIDE 5 MG/1
5 TABLET ORAL PRN
Status: DISCONTINUED | OUTPATIENT
Start: 2025-06-16 | End: 2025-06-16 | Stop reason: HOSPADM

## 2025-06-16 RX ORDER — PROMETHAZINE HYDROCHLORIDE 12.5 MG/1
12.5 TABLET ORAL EVERY 6 HOURS PRN
Status: DISCONTINUED | OUTPATIENT
Start: 2025-06-16 | End: 2025-06-19 | Stop reason: HOSPADM

## 2025-06-16 RX ORDER — ONDANSETRON 2 MG/ML
INJECTION INTRAMUSCULAR; INTRAVENOUS
Status: DISCONTINUED | OUTPATIENT
Start: 2025-06-16 | End: 2025-06-16 | Stop reason: SDUPTHER

## 2025-06-16 RX ORDER — MIDAZOLAM HYDROCHLORIDE 1 MG/ML
INJECTION, SOLUTION INTRAMUSCULAR; INTRAVENOUS
Status: DISCONTINUED | OUTPATIENT
Start: 2025-06-16 | End: 2025-06-16 | Stop reason: SDUPTHER

## 2025-06-16 RX ORDER — PROCHLORPERAZINE EDISYLATE 5 MG/ML
5 INJECTION INTRAMUSCULAR; INTRAVENOUS
Status: DISCONTINUED | OUTPATIENT
Start: 2025-06-16 | End: 2025-06-16 | Stop reason: HOSPADM

## 2025-06-16 RX ORDER — SODIUM CHLORIDE 9 MG/ML
INJECTION, SOLUTION INTRAVENOUS PRN
Status: DISCONTINUED | OUTPATIENT
Start: 2025-06-16 | End: 2025-06-19 | Stop reason: HOSPADM

## 2025-06-16 RX ORDER — DIPHENHYDRAMINE HYDROCHLORIDE 50 MG/ML
25 INJECTION, SOLUTION INTRAMUSCULAR; INTRAVENOUS EVERY 6 HOURS PRN
Status: DISCONTINUED | OUTPATIENT
Start: 2025-06-16 | End: 2025-06-19 | Stop reason: HOSPADM

## 2025-06-16 RX ORDER — SODIUM CHLORIDE 0.9 % (FLUSH) 0.9 %
5-40 SYRINGE (ML) INJECTION EVERY 12 HOURS SCHEDULED
Status: DISCONTINUED | OUTPATIENT
Start: 2025-06-16 | End: 2025-06-19 | Stop reason: HOSPADM

## 2025-06-16 RX ORDER — OXYCODONE HYDROCHLORIDE 5 MG/1
5 TABLET ORAL EVERY 4 HOURS PRN
Status: DISCONTINUED | OUTPATIENT
Start: 2025-06-16 | End: 2025-06-19 | Stop reason: HOSPADM

## 2025-06-16 RX ORDER — LIDOCAINE HYDROCHLORIDE ANHYDROUS AND DEXTROSE MONOHYDRATE 5; 400 G/100ML; MG/100ML
1 INJECTION, SOLUTION INTRAVENOUS CONTINUOUS
Status: DISPENSED | OUTPATIENT
Start: 2025-06-16 | End: 2025-06-17

## 2025-06-16 RX ORDER — DULOXETIN HYDROCHLORIDE 30 MG/1
30 CAPSULE, DELAYED RELEASE ORAL EVERY MORNING
Status: DISCONTINUED | OUTPATIENT
Start: 2025-06-17 | End: 2025-06-19 | Stop reason: HOSPADM

## 2025-06-16 RX ORDER — GLYCOPYRROLATE 0.2 MG/ML
INJECTION INTRAMUSCULAR; INTRAVENOUS
Status: DISCONTINUED | OUTPATIENT
Start: 2025-06-16 | End: 2025-06-16 | Stop reason: SDUPTHER

## 2025-06-16 RX ORDER — OXYCODONE HYDROCHLORIDE 5 MG/1
10 TABLET ORAL EVERY 4 HOURS PRN
Status: DISCONTINUED | OUTPATIENT
Start: 2025-06-16 | End: 2025-06-19 | Stop reason: HOSPADM

## 2025-06-16 RX ADMIN — ONDANSETRON 4 MG: 2 INJECTION, SOLUTION INTRAMUSCULAR; INTRAVENOUS at 07:45

## 2025-06-16 RX ADMIN — TRANEXAMIC ACID 200 MG: 100 INJECTION, SOLUTION INTRAVENOUS at 10:28

## 2025-06-16 RX ADMIN — SODIUM CHLORIDE, SODIUM LACTATE, POTASSIUM CHLORIDE, AND CALCIUM CHLORIDE: 600; 310; 30; 20 INJECTION, SOLUTION INTRAVENOUS at 05:51

## 2025-06-16 RX ADMIN — SODIUM CHLORIDE, SODIUM LACTATE, POTASSIUM CHLORIDE, AND CALCIUM CHLORIDE: 600; 310; 30; 20 INJECTION, SOLUTION INTRAVENOUS at 08:06

## 2025-06-16 RX ADMIN — NEOSTIGMINE METHYLSULFATE 5 MG: 1 INJECTION INTRAVENOUS at 09:57

## 2025-06-16 RX ADMIN — PHENYLEPHRINE HYDROCHLORIDE 50 MCG: 0.1 INJECTION, SOLUTION INTRAVENOUS at 07:41

## 2025-06-16 RX ADMIN — LISINOPRIL 2.5 MG: 5 TABLET ORAL at 16:24

## 2025-06-16 RX ADMIN — PHENOL 1 SPRAY: 1.5 LIQUID ORAL at 22:38

## 2025-06-16 RX ADMIN — HYDROMORPHONE HYDROCHLORIDE 0.5 MG: 0.5 INJECTION, SOLUTION INTRAMUSCULAR; INTRAVENOUS; SUBCUTANEOUS at 07:17

## 2025-06-16 RX ADMIN — EPHEDRINE SULFATE 10 MG: 5 INJECTION INTRAVENOUS at 08:13

## 2025-06-16 RX ADMIN — PHENYLEPHRINE HYDROCHLORIDE 100 MCG: 0.1 INJECTION, SOLUTION INTRAVENOUS at 08:02

## 2025-06-16 RX ADMIN — GLYCOPYRROLATE 0.8 MG: 0.2 INJECTION INTRAMUSCULAR; INTRAVENOUS at 09:57

## 2025-06-16 RX ADMIN — PHENYLEPHRINE HYDROCHLORIDE 150 MCG: 0.1 INJECTION, SOLUTION INTRAVENOUS at 08:11

## 2025-06-16 RX ADMIN — ROCURONIUM BROMIDE 20 MG: 10 INJECTION, SOLUTION INTRAVENOUS at 08:15

## 2025-06-16 RX ADMIN — Medication 3 AMPULE: at 05:52

## 2025-06-16 RX ADMIN — OXYCODONE 10 MG: 5 TABLET ORAL at 20:53

## 2025-06-16 RX ADMIN — MIDAZOLAM 2 MG: 1 INJECTION INTRAMUSCULAR; INTRAVENOUS at 07:12

## 2025-06-16 RX ADMIN — PHENYLEPHRINE HYDROCHLORIDE 150 MCG: 0.1 INJECTION, SOLUTION INTRAVENOUS at 09:11

## 2025-06-16 RX ADMIN — Medication 2000 MG: at 07:37

## 2025-06-16 RX ADMIN — POLYETHYLENE GLYCOL 3350 17 G: 17 POWDER, FOR SOLUTION ORAL at 13:55

## 2025-06-16 RX ADMIN — LIDOCAINE HYDROCHLORIDE 100 MG: 20 INJECTION, SOLUTION EPIDURAL; INFILTRATION; INTRACAUDAL; PERINEURAL at 07:18

## 2025-06-16 RX ADMIN — TRANEXAMIC ACID 1000 MG: 100 INJECTION, SOLUTION INTRAVENOUS at 09:59

## 2025-06-16 RX ADMIN — KETOROLAC TROMETHAMINE 15 MG: 15 INJECTION, SOLUTION INTRAMUSCULAR; INTRAVENOUS at 13:55

## 2025-06-16 RX ADMIN — HYDROMORPHONE HYDROCHLORIDE 0.5 MG: 0.5 INJECTION, SOLUTION INTRAMUSCULAR; INTRAVENOUS; SUBCUTANEOUS at 07:49

## 2025-06-16 RX ADMIN — PHENYLEPHRINE HYDROCHLORIDE 100 MCG: 0.1 INJECTION, SOLUTION INTRAVENOUS at 09:22

## 2025-06-16 RX ADMIN — ROCURONIUM BROMIDE 50 MG: 10 INJECTION, SOLUTION INTRAVENOUS at 07:19

## 2025-06-16 RX ADMIN — DEXAMETHASONE SODIUM PHOSPHATE 10 MG: 10 INJECTION INTRAMUSCULAR; INTRAVENOUS at 07:45

## 2025-06-16 RX ADMIN — BISACODYL 5 MG: 5 TABLET, COATED ORAL at 13:55

## 2025-06-16 RX ADMIN — ACETAMINOPHEN 650 MG: 325 TABLET ORAL at 13:54

## 2025-06-16 RX ADMIN — ROCURONIUM BROMIDE 30 MG: 10 INJECTION, SOLUTION INTRAVENOUS at 07:46

## 2025-06-16 RX ADMIN — Medication 10 MG: at 08:47

## 2025-06-16 RX ADMIN — PHENYLEPHRINE HYDROCHLORIDE 150 MCG: 0.1 INJECTION, SOLUTION INTRAVENOUS at 08:28

## 2025-06-16 RX ADMIN — ATORVASTATIN CALCIUM 20 MG: 20 TABLET, FILM COATED ORAL at 20:53

## 2025-06-16 RX ADMIN — TRANEXAMIC ACID 1000 MG: 100 INJECTION, SOLUTION INTRAVENOUS at 07:54

## 2025-06-16 RX ADMIN — ACETAMINOPHEN 650 MG: 325 TABLET ORAL at 18:28

## 2025-06-16 RX ADMIN — EPHEDRINE SULFATE 10 MG: 5 INJECTION INTRAVENOUS at 08:16

## 2025-06-16 RX ADMIN — PHENYLEPHRINE HYDROCHLORIDE 200 MCG: 0.1 INJECTION, SOLUTION INTRAVENOUS at 08:22

## 2025-06-16 RX ADMIN — PHENYLEPHRINE HYDROCHLORIDE 150 MCG: 0.1 INJECTION, SOLUTION INTRAVENOUS at 08:05

## 2025-06-16 RX ADMIN — SODIUM CHLORIDE: 0.9 INJECTION, SOLUTION INTRAVENOUS at 14:06

## 2025-06-16 RX ADMIN — Medication 10 MG: at 10:01

## 2025-06-16 RX ADMIN — CEFAZOLIN 2000 MG: 10 INJECTION, POWDER, FOR SOLUTION INTRAVENOUS at 16:24

## 2025-06-16 RX ADMIN — PHENYLEPHRINE HYDROCHLORIDE 150 MCG: 0.1 INJECTION, SOLUTION INTRAVENOUS at 08:46

## 2025-06-16 RX ADMIN — KETOROLAC TROMETHAMINE 15 MG: 15 INJECTION, SOLUTION INTRAMUSCULAR; INTRAVENOUS at 18:27

## 2025-06-16 RX ADMIN — HYDROMORPHONE HYDROCHLORIDE 0.5 MG: 0.5 INJECTION, SOLUTION INTRAMUSCULAR; INTRAVENOUS; SUBCUTANEOUS at 10:03

## 2025-06-16 RX ADMIN — ACETAMINOPHEN 1000 MG: 500 TABLET, FILM COATED ORAL at 06:59

## 2025-06-16 RX ADMIN — PROPOFOL 200 MG: 10 INJECTION, EMULSION INTRAVENOUS at 07:18

## 2025-06-16 ASSESSMENT — PAIN DESCRIPTION - LOCATION: LOCATION: BACK;LEG

## 2025-06-16 ASSESSMENT — PAIN DESCRIPTION - ONSET: ONSET: GRADUAL

## 2025-06-16 ASSESSMENT — PAIN - FUNCTIONAL ASSESSMENT
PAIN_FUNCTIONAL_ASSESSMENT: CRITICAL CARE PAIN OBSERVATION TOOL (CPOT)
PAIN_FUNCTIONAL_ASSESSMENT: 0-10
PAIN_FUNCTIONAL_ASSESSMENT: PREVENTS OR INTERFERES SOME ACTIVE ACTIVITIES AND ADLS
PAIN_FUNCTIONAL_ASSESSMENT: CRITICAL CARE PAIN OBSERVATION TOOL (CPOT)
PAIN_FUNCTIONAL_ASSESSMENT: NONE - DENIES PAIN
PAIN_FUNCTIONAL_ASSESSMENT: 0-10

## 2025-06-16 ASSESSMENT — PAIN SCALES - GENERAL
PAINLEVEL_OUTOF10: 0
PAINLEVEL_OUTOF10: 3
PAINLEVEL_OUTOF10: 8
PAINLEVEL_OUTOF10: 0

## 2025-06-16 ASSESSMENT — PAIN DESCRIPTION - ORIENTATION: ORIENTATION: RIGHT

## 2025-06-16 ASSESSMENT — PAIN DESCRIPTION - PAIN TYPE: TYPE: SURGICAL PAIN

## 2025-06-16 ASSESSMENT — PAIN DESCRIPTION - FREQUENCY: FREQUENCY: INTERMITTENT

## 2025-06-16 ASSESSMENT — PAIN DESCRIPTION - DESCRIPTORS: DESCRIPTORS: ACHING

## 2025-06-16 NOTE — ANESTHESIA PRE PROCEDURE
Department of Anesthesiology  Preprocedure Note       Name:  Arnulfo Wright   Age:  69 y.o.  :  1955                                          MRN:  [id number]         Date:  2025      Surgeon: Surgeon(s):  Priyank Gray Jr., MD    Procedure: Procedure(s):  L2-L4 lumbar laminectomy    Medications prior to admission:   Prior to Admission medications    Medication Sig Start Date End Date Taking? Authorizing Provider   Naproxen Sodium 220 MG CAPS Take 220 mg by mouth as needed for Pain   Yes Dorothy Rojo MD   acetaminophen (TYLENOL 8 HOUR ARTHRITIS PAIN) 650 MG extended release tablet Take 1 tablet by mouth every 8 hours as needed for Pain   Yes ProviderDorothy MD   TRULICITY 0.75 MG/0.5ML SOAJ SC injection 0.5 mLs once a week Takes every Bro 3/3/25  Yes Dorothy Rojo MD   DULoxetine (CYMBALTA) 30 MG extended release capsule Take 1 capsule by mouth daily  Patient taking differently: Take 1 capsule by mouth every morning 10/2/24  Yes Elan Hardwick PA   simvastatin (ZOCOR) 20 MG tablet Take 1 tablet by mouth nightly 24  Yes ProviderDorothy MD   pramipexole (MIRAPEX) 1.5 MG tablet Take 1 tablet by mouth 2 times daily 23  Yes ProviderDorothy MD   metFORMIN (GLUCOPHAGE) 1000 MG tablet Take 1 tablet by mouth 2 times daily 23  Yes Dorothy Rojo MD   lisinopril (PRINIVIL;ZESTRIL) 2.5 MG tablet Take 1 tablet by mouth every evening 23  Yes Dorothy Rojo MD   cyclobenzaprine (FLEXERIL) 10 MG tablet Take 1 tablet by mouth every 8 hours as needed for Muscle spasms 22  Yes Dorothy Rojo MD   aspirin 81 MG EC tablet Take 1 tablet by mouth every evening Indications: Preventative 23  Yes Dorothy Rojo MD PX Lancets MicroThin 33G MISC USE TO CHECK BLOOD SUGAR DAILY 24   Dorothy Rojo MD   TRUE METRIX BLOOD GLUCOSE TEST strip USE TO CHECK BLOOD SUGAR ONE TIME DAILY 24   Dorothy Rojo MD

## 2025-06-16 NOTE — PERIOP NOTE
TRANSFER - OUT REPORT:    Verbal report given to Kandice VAZQUEZ on Arnulfo Wright  being transferred to [facility] for routine progression of patient care       Report consisted of patient’s Situation, Background, Assessment and   Recommendations(SBAR).     Information from the following report(s) Nurse Handoff Report, Adult Overview, Surgery Report, Intake/Output, MAR, Recent Results, Cardiac Rhythm Sinus, Procedure Verification, Quality Measures, Neuro Assessment, and Event Log was reviewed with the receiving nurse.    Lines:   Peripheral IV 06/16/25 Posterior;Right Hand (Active)   Site Assessment Clean, dry & intact 06/16/25 1045   Line Status Flushed;Infusing 06/16/25 1045   Phlebitis Assessment No symptoms 06/16/25 1045   Infiltration Assessment 0 06/16/25 1045   Alcohol Cap Used No 06/16/25 0548   Dressing Status Clean, dry & intact 06/16/25 1045   Dressing Type Transparent 06/16/25 1045   Dressing Intervention New 06/16/25 0548        Opportunity for questions and clarification was provided.      Patient transported with:  O2 @ 4L NC      VTE prophylaxis orders have been written for Arnulfo Wright.    Patient and family given floor number and nurses name.  Family updated re: pt status after security code verified.

## 2025-06-16 NOTE — PROGRESS NOTES
Pt c/o urinary discomfort and inability to void. Bladder scan showed 658. Straight cath x1 with 700 out.

## 2025-06-16 NOTE — PROGRESS NOTES
4 Eyes Skin Assessment     NAME:  Arnulfo Wright  YOB: 1955  MEDICAL RECORD NUMBER:  [id number]    The patient is being assessed for  Admission    I agree that at least one RN has performed a thorough Head to Toe Skin Assessment on the patient. ALL assessment sites listed below have been assessed.      Areas assessed by both nurses:    Sacrum. Buttock, Coccyx, Ischium        Does the Patient have a Wound? No noted wound(s)       Peter Prevention initiated by RN: Yes  Wound Care Orders initiated by RN: No    Pressure Injury (Stage 3,4, Unstageable, DTI, NWPT, and Complex wounds) if present, place Wound referral order by RN under : No    New Ostomies, if present place, Ostomy referral order under : No     Nurse 1 eSignature: Electronically signed by Kandice Ceballos RN on 6/16/25 at 2:55 PM EDT    **SHARE this note so that the co-signing nurse can place an eSignature**    Nurse 2 eSignature: Electronically signed by JACOB DINH RN on 6/16/25 at 4:08 PM EDT

## 2025-06-16 NOTE — PROGRESS NOTES
Physical Therapy Note:    Attempted to see patient this PM for physical therapy evaluation session. Per RN pt on bedrest for 24 hours. Will follow and re-attempt as schedule permits/patient appropriate for therapy. Thank you,    LASHAY SAL, PT     Rehab Caseload Tracker

## 2025-06-16 NOTE — PROGRESS NOTES
Occupational Therapy Note:    Attempted to see patient this PM for occupational therapy evaluation session. Patient is currently on bedrest x24 hours per RN. Will follow and re-attempt as schedule permits/patient available. Thank you,    JASON Dumont, OT    Rehab Caseload Tracker

## 2025-06-16 NOTE — INTERVAL H&P NOTE
Update History & Physical    The patient's History and Physical of June 1, 2025 was reviewed with the patient and I examined the patient. There was no change. The surgical site was confirmed by the patient and me.     RRR by peripheral pulse, normal work of breathing with no audible wheezing or accessory muscle usage    Lower Extremities:     HF (L2) KE (L3/4) ADF (L4) EHL (L5) APF (S1)   Right 5 5 5 5 5   Left 5 5 5 5 5           Plan: The risks, benefits, expected outcome, and alternative to the recommended procedure have been discussed with the patient. Patient understands and wants to proceed with the procedure.     Electronically signed by Priyank Gray Jr, MD on 6/16/2025 at 6:56 AM

## 2025-06-16 NOTE — PERIOP NOTE
Lidocaine drip pump settings confirmed at Ideal body weight: 82.2 kg (181 lb 3.5 oz) .  Infusing at 20.6 ml/hour.

## 2025-06-17 PROBLEM — I10 HYPERTENSION: Status: ACTIVE | Noted: 2025-06-17

## 2025-06-17 PROBLEM — E78.5 HYPERLIPIDEMIA: Status: ACTIVE | Noted: 2025-06-17

## 2025-06-17 PROBLEM — E11.9 DIABETES MELLITUS (HCC): Status: ACTIVE | Noted: 2025-06-17

## 2025-06-17 LAB
GLUCOSE BLD STRIP.AUTO-MCNC: 123 MG/DL (ref 65–100)
GLUCOSE BLD STRIP.AUTO-MCNC: 129 MG/DL (ref 65–100)
GLUCOSE BLD STRIP.AUTO-MCNC: 139 MG/DL (ref 65–100)
GLUCOSE BLD STRIP.AUTO-MCNC: 170 MG/DL (ref 65–100)
GLUCOSE BLD STRIP.AUTO-MCNC: 207 MG/DL (ref 65–100)
SERVICE CMNT-IMP: ABNORMAL

## 2025-06-17 PROCEDURE — 82962 GLUCOSE BLOOD TEST: CPT

## 2025-06-17 PROCEDURE — G0378 HOSPITAL OBSERVATION PER HR: HCPCS

## 2025-06-17 PROCEDURE — 6370000000 HC RX 637 (ALT 250 FOR IP): Performed by: FAMILY MEDICINE

## 2025-06-17 PROCEDURE — 6370000000 HC RX 637 (ALT 250 FOR IP): Performed by: ORTHOPAEDIC SURGERY

## 2025-06-17 PROCEDURE — 2500000003 HC RX 250 WO HCPCS: Performed by: ORTHOPAEDIC SURGERY

## 2025-06-17 PROCEDURE — 51798 US URINE CAPACITY MEASURE: CPT

## 2025-06-17 PROCEDURE — 6360000002 HC RX W HCPCS: Performed by: ORTHOPAEDIC SURGERY

## 2025-06-17 RX ORDER — INSULIN GLARGINE 100 [IU]/ML
10 INJECTION, SOLUTION SUBCUTANEOUS DAILY
Status: DISCONTINUED | OUTPATIENT
Start: 2025-06-17 | End: 2025-06-19 | Stop reason: HOSPADM

## 2025-06-17 RX ADMIN — PRAMIPEXOLE DIHYDROCHLORIDE 1.5 MG: 0.5 TABLET ORAL at 20:40

## 2025-06-17 RX ADMIN — HYDROMORPHONE HYDROCHLORIDE 0.5 MG: 1 INJECTION, SOLUTION INTRAMUSCULAR; INTRAVENOUS; SUBCUTANEOUS at 20:41

## 2025-06-17 RX ADMIN — ACETAMINOPHEN 650 MG: 325 TABLET ORAL at 08:04

## 2025-06-17 RX ADMIN — OXYCODONE 10 MG: 5 TABLET ORAL at 18:20

## 2025-06-17 RX ADMIN — ACETAMINOPHEN 650 MG: 325 TABLET ORAL at 20:41

## 2025-06-17 RX ADMIN — ATORVASTATIN CALCIUM 20 MG: 20 TABLET, FILM COATED ORAL at 20:46

## 2025-06-17 RX ADMIN — ACETAMINOPHEN 650 MG: 325 TABLET ORAL at 12:37

## 2025-06-17 RX ADMIN — KETOROLAC TROMETHAMINE 15 MG: 15 INJECTION, SOLUTION INTRAMUSCULAR; INTRAVENOUS at 08:04

## 2025-06-17 RX ADMIN — ACETAMINOPHEN 650 MG: 325 TABLET ORAL at 01:12

## 2025-06-17 RX ADMIN — KETOROLAC TROMETHAMINE 15 MG: 15 INJECTION, SOLUTION INTRAMUSCULAR; INTRAVENOUS at 01:12

## 2025-06-17 RX ADMIN — LISINOPRIL 2.5 MG: 5 TABLET ORAL at 17:33

## 2025-06-17 RX ADMIN — CEFAZOLIN 2000 MG: 10 INJECTION, POWDER, FOR SOLUTION INTRAVENOUS at 02:10

## 2025-06-17 RX ADMIN — DULOXETINE HYDROCHLORIDE 30 MG: 30 CAPSULE, DELAYED RELEASE ORAL at 08:04

## 2025-06-17 RX ADMIN — BISACODYL 5 MG: 5 TABLET, COATED ORAL at 08:04

## 2025-06-17 RX ADMIN — POLYETHYLENE GLYCOL 3350 17 G: 17 POWDER, FOR SOLUTION ORAL at 08:04

## 2025-06-17 RX ADMIN — SODIUM CHLORIDE, PRESERVATIVE FREE 10 ML: 5 INJECTION INTRAVENOUS at 08:06

## 2025-06-17 RX ADMIN — ENOXAPARIN SODIUM 30 MG: 100 INJECTION SUBCUTANEOUS at 17:33

## 2025-06-17 RX ADMIN — METFORMIN HYDROCHLORIDE 1000 MG: 500 TABLET ORAL at 17:33

## 2025-06-17 RX ADMIN — SODIUM CHLORIDE, PRESERVATIVE FREE 10 ML: 5 INJECTION INTRAVENOUS at 20:43

## 2025-06-17 ASSESSMENT — PAIN - FUNCTIONAL ASSESSMENT
PAIN_FUNCTIONAL_ASSESSMENT: PREVENTS OR INTERFERES SOME ACTIVE ACTIVITIES AND ADLS
PAIN_FUNCTIONAL_ASSESSMENT: ACTIVITIES ARE NOT PREVENTED

## 2025-06-17 ASSESSMENT — PAIN DESCRIPTION - PAIN TYPE
TYPE: SURGICAL PAIN
TYPE: SURGICAL PAIN

## 2025-06-17 ASSESSMENT — PAIN DESCRIPTION - DESCRIPTORS
DESCRIPTORS: ACHING;DISCOMFORT
DESCRIPTORS: ACHING

## 2025-06-17 ASSESSMENT — PAIN SCALES - GENERAL
PAINLEVEL_OUTOF10: 4
PAINLEVEL_OUTOF10: 3
PAINLEVEL_OUTOF10: 8
PAINLEVEL_OUTOF10: 9
PAINLEVEL_OUTOF10: 0

## 2025-06-17 ASSESSMENT — PAIN DESCRIPTION - ONSET
ONSET: GRADUAL
ONSET: GRADUAL

## 2025-06-17 ASSESSMENT — PAIN DESCRIPTION - ORIENTATION
ORIENTATION: POSTERIOR
ORIENTATION: LOWER;MID

## 2025-06-17 ASSESSMENT — PAIN DESCRIPTION - FREQUENCY
FREQUENCY: INTERMITTENT
FREQUENCY: INTERMITTENT

## 2025-06-17 ASSESSMENT — PAIN DESCRIPTION - LOCATION
LOCATION: BACK
LOCATION: BACK

## 2025-06-17 NOTE — CARE COORDINATION
Chart reviewed by  for potential transition of care (SHIV) needs or concerns. Pt is insured with pharmacy benefits and is established with a PCP.  CM met with pt/spouse to deliver ASHBY letter and initiate SHIV planning.  Pt currently on bedrest.  Therapy evals pending.  SHIV needs unknown at present but CM will continue to follow to assist.  Please notify/consult  if SHIV needs arise.       06/17/25 5181   Service Assessment   Patient Orientation Alert and Oriented   Cognition Alert   History Provided By Patient;Spouse;Medical Record   Primary Caregiver Self   Accompanied By/Relationship spouse; family members; grandchildren   Support Systems Spouse/Significant Other;Children;Family Members   Patient's Healthcare Decision Maker is: Legal Next of Kin   PCP Verified by CM Yes   Last Visit to PCP Within last 6 months  (3/7/2025)   Prior Functional Level Independent in ADLs/IADLs   Current Functional Level   (TBD by therapy evals-pt currently on bedrest)   Can patient return to prior living arrangement Unknown at present   Ability to make needs known: Good   Family able to assist with home care needs: Yes   Would you like for me to discuss the discharge plan with any other family members/significant others, and if so, who? Yes  (spouse)   Financial Resources Medicare   Social/Functional History   Lives With Spouse   Type of Home House   Prior Level of Assist for ADLs Independent   Prior Level of Assist for Homemaking Independent   Homemaking Responsibilities Yes   Ambulation Assistance Independent   Prior Level of Assist for Transfers Independent   Occupation Retired   Discharge Planning   Type of Residence House   Living Arrangements Spouse/Significant Other   Current Services Prior To Admission None   Potential Assistance Needed Durable Medical Equipment;Home Care   Potential DME Needed Walker   Potential Assistance Purchasing Medications No   Patient expects to be discharged to: House   Services

## 2025-06-17 NOTE — ANESTHESIA POSTPROCEDURE EVALUATION
Department of Anesthesiology  Postprocedure Note    Patient: Arnulfo Wright  MRN: [id number]  YOB: 1955  Date of evaluation: 6/16/2025    Procedure Summary       Date: 06/16/25 Room / Location: [facility] MAIN OR  / [facility] MAIN OR    Anesthesia Start: 0705 Anesthesia Stop: 1047    Procedure: L2-L4 lumbar laminectomy (Spine Lumbar) Diagnosis:       Spinal stenosis, lumbar region, with neurogenic claudication      Degeneration of intervertebral disc of lumbar region with discogenic back pain and lower extremity pain      Lumbar radiculopathy      (Spinal stenosis, lumbar region, with neurogenic claudication [M48.062])      (Degeneration of intervertebral disc of lumbar region with discogenic back pain and lower extremity pain [M51.362])      (Lumbar radiculopathy [M54.16])    Providers: Priyank Gray Jr., MD Responsible Provider: Rafael Aguilar IV, MD    Anesthesia Type: General ASA Status: 2            Anesthesia Type: General    Steve Phase I: Steve Score: 9    Steve Phase II:      Anesthesia Post Evaluation    Patient location during evaluation: PACU  Patient participation: complete - patient participated  Level of consciousness: awake  Airway patency: patent  Nausea & Vomiting: no nausea and no vomiting  Cardiovascular status: hemodynamically stable  Respiratory status: acceptable, nonlabored ventilation and spontaneous ventilation  Hydration status: euvolemic  Comments: /65   Pulse 77   Temp 97.9 °F (36.6 °C) (Oral)   Resp 20   Ht 1.88 m (6' 2\")   Wt 107 kg (236 lb)   SpO2 96%   BMI 30.30 kg/m²     Multimodal analgesia pain management approach  Pain management: adequate and satisfactory to patient        No notable events documented.

## 2025-06-17 NOTE — PROGRESS NOTES
Patient seen on the floor with the wife at his bedside.  Patient is currently resting in bed with bed flat precautions.  He does report a significant improvement in his right lower extremity radicular symptoms.  He has been having some difficulty urinating and required straight catheterization.  His wife reports that his previous lumbar surgery required similar patients and difficulty with urination.  The patient states that he has previously had a hard time urinating unless standing up.  Not having any headaches.  Denies any numbness or tingling.    Lower Extremities:     HF (L2) KE (L3/4) ADF (L4) EHL (L5) APF (S1)   Right 5 5 5 5 5   Left 5 5 5 5 5     No gross numbness reported on exam.         Continue bed flat precautions.  Monitor for headaches  Hold PT/OT for now  Perioperative antibiotics ordered  Likely begin chemoprophylaxis with Lovenox on p.m. of POD 1

## 2025-06-17 NOTE — OP NOTE
[facility] [id number]   [phone]    OPERATIVE REPORT    Patient ID:Arnulfo Wright  [id number]  1955  69 y.o.    DATE OF SURGERY: 6/16/2025    SURGEON: Priyank Gray MD    PREOPERATIVE DIAGNOSIS: Lumbar stenosis    POSTOPERATIVE DIAGNOSIS: Lumbar stenosis    PROCEDURE:    1. Lumbar laminectomy L2-4 (CPT 79669, 12033)     ANESTHESIA: General.    ESTIMATED BLOOD LOSS: 100 ml    POSTOPERATIVE CONDITION: Stable.    INTRAOPERATIVE COMPLICATIONS: None.    INDICATIONS FOR PROCEDURE: Patient is well-known to me, 69-year-old male with severe lumbar stenosis and predominantly right lower extremity symptoms.  Symptoms were persistent despite physical therapy and RADHA.  Pain was much more pronounced in the legs and the back.  Had no pain with range of motion of the hip.  Patient did have a previous L4-5 surgery that resulted in fusion at the L4-5 segment, this was in 1996.  Discussion was conducted previous in office regarding the risk, benefits and alternatives to surgery and we revisited those discussions this morning before the procedure as well as on the phone several days ago.  All questions were answered and the patient elected to proceed with surgery    DESCRIPTION OF PROCEDURE: After adequate induction of general anesthesia, the patient was positioned prone on the Harry spinal table. Care was taken to pad all bony prominences. The shoulders and elbows were placed in the 90/90 position. The abdomen was allowed to hang free to decrease intraabdominal and venous pressure.  Crosstable lateral was used to grossly localize the area of incision.  The lumbar area was prepped and draped in the usual sterile fashion. Preoperative antibiotic was administered. A time out was called to confirm the appropriate patient, proposed procedure and proposed incision sites. With this conformation, an incision was created midline, over the lumbar spinous processes. Dissection was

## 2025-06-17 NOTE — PROGRESS NOTES
Physical Therapy Note:    Physical therapy evaluation orders received and chart reviewed.  Patient is on bedrest, effective 1330 on 6/16/25, initially for 24 hrs and has now been extended for an additional 24 hr. Will follow and re-attempt at a later time/date as schedule permits/patient available.    Thank you,  Krystin Mcdonald, PT, DPT

## 2025-06-17 NOTE — PROGRESS NOTES
Occupational Therapy Note:    Attempted to see patient this AM for occupational therapy evaluation session. Per nursing, bedrest extended until tomorrow. Will follow and re-attempt as schedule permits/patient available.     Thank you,    TATIANNA CAMARA, OT    Rehab Caseload Tracker

## 2025-06-17 NOTE — PROGRESS NOTES
ORTHOPAEDIC PROGRESS NOTE    2025  Admit Date: 2025  Admit Diagnosis: Spinal stenosis, lumbar region, with neurogenic claudication [M48.062]  Degeneration of intervertebral disc of lumbar region with discogenic back pain and lower extremity pain [M51.362]  Lumbar radiculopathy [M54.16]  Procedure: Procedure(s):  L2-L4 lumbar laminectomy  Post Op day: 1 Day Post-Op      Subjective:     rAnulfo Wright is a patient who post op day 1 after L2-4 laminectomy complicated by incidental intraoperative durotomy repaired with patch and DuraSeal.  The patient has remained in bed flat precautions.  He has required In-N-Out catheterization x 2 since surgery.  His wife and the patient report that he has had significant issues in the past with urinating after surgery and the patient feels that this is much harder when laying flat.  He states that he feels like if he could stand up he could urinate without issue.  Still noticing a significant improvement in his right lower extremity symptoms.  Drain off suction.      Objective:     Vital Signs:    Blood pressure 130/74, pulse 67, temperature 97.5 °F (36.4 °C), temperature source Oral, resp. rate 18, height 1.88 m (6' 2\"), weight 107 kg (236 lb), SpO2 95%.    Temp (24hrs), Av.7 °F (36.5 °C), Min:97.5 °F (36.4 °C), Max:98 °F (36.7 °C)      No intake/output data recorded.  06/15 1901 -  0700  In: 1500 [I.V.:1500]  Out: 1495 [Urine:1300; Drains:45]    LAB:    Recent Labs     25  1426   HGB 13.0*   WBC 5.6          Physical Exam    General:   Alert and oriented. No acute distress  Lungs:  Respirations unlabored.  Extremities: No evidence of cyanosis.     Moves both upper and lower extremities.     Lower Extremities:     HF (L2) KE (L3/4) ADF (L4) EHL (L5) APF (S1)   Right 5 5 5 5 5   Left 5 5 5 5 5     No gross numbness reported on exam.             Output by Drain (mL) 06/15/25 0701 - 06/15/25 1900 06/15/25 1901 - 25 0700 25 0701 -

## 2025-06-17 NOTE — PROGRESS NOTES
Pt continues to have urinary discomfort and inability to void. Bladder scan showed 571 mL. Straight cath x1 with 600 mL output.

## 2025-06-17 NOTE — PROGRESS NOTES
End of Shift-Night Spine Eras Patient  1 Day Post-Op   Ideal body weight: 82.2 kg (181 lb 3.5 oz)    OOB POD#0 (within 6 hours of end anesthesia-if not contraindicated) No     Ambulated in [place] 0 times.     Up to chair 0 times    Lidocaine: Yes Discontinued POD#1 approx 0600 Yes    PRN Pain Medications Used?: Yes    IS Used 10x/hr while awke: Yes     DRAINS? Yes    Antony? No    BM?  No   Passing flatus? Yes    TEDs Yes    SCDs Yes         Signed By: ERI NGUYỄN RN     June 17, 2025

## 2025-06-17 NOTE — CONSULTS
[facility] Hospitalist Consult   Admit Date:  2025  5:15 AM   Name:  Arnulfo Wright   Age:  69 y.o.  Sex:  male  :  1955   MRN:  [id number]   Room:  711/    Presenting/Chief Complaint: No chief complaint on file.    Reason(s) for Admission: Spinal stenosis, lumbar region, with neurogenic claudication [M48.062]  Degeneration of intervertebral disc of lumbar region with discogenic back pain and lower extremity pain [M51.362]  Lumbar radiculopathy [M54.16]     Hospitalists consulted by Priyank Gray* for: Postop medical management    History of Presenting Illness:   Arnulfo Wright is a 69 y.o. male with history of hypertension, hyperlipidemia, diabetes mellitus, mood disorder admitted by orthospine for lumbar spinal stenosis, status post L2-L4 laminectomy complicated by incidental intraoperative durotomy repaired with patch and DuraSeal on .  Hospitalist consulted for postop medical management.    Patient seen and examined at the bedside.  Reports overall feeling better.  On strict bedrest precautions.  Required In-N-Out catheterization x 2 since yesterday.  Reports significant improvement in lower extremity symptoms.  Denies nausea, vomiting, chest pain or palpitation.  Denies shortness of breath.    Assessment & Plan:     Lumbar spinal stenosis:  Status post L2-L4 laminectomy complicated by incidental intraoperative durotomy repaired with patch and DuraSeal on   Management as per primary  PT/OT as per primary  Patient required in and out catheterization x 2, if unable to urinate then will recommend to place Antony catheter.    Diabetes mellitus:  Continue Lantus 27 units daily  Continue medium dose sliding scale  Adjust insulin regimen accordingly    Hypertension/hyperlipidemia:  Continue lisinopril and atorvastatin    Mood disorder:  Continue Cymbalta    Anticipated Discharge Arrangements:   Defer to primary team    PT/OT evals ordered?  Defer to primary team  Diet:

## 2025-06-18 PROBLEM — M48.062 LUMBAR STENOSIS WITH NEUROGENIC CLAUDICATION: Status: ACTIVE | Noted: 2025-06-18

## 2025-06-18 LAB
ANION GAP SERPL CALC-SCNC: 9 MMOL/L (ref 7–16)
BUN SERPL-MCNC: 11 MG/DL (ref 8–23)
CALCIUM SERPL-MCNC: 8.6 MG/DL (ref 8.8–10.2)
CHLORIDE SERPL-SCNC: 105 MMOL/L (ref 98–107)
CO2 SERPL-SCNC: 25 MMOL/L (ref 20–29)
CREAT SERPL-MCNC: 0.68 MG/DL (ref 0.8–1.3)
ERYTHROCYTE [DISTWIDTH] IN BLOOD BY AUTOMATED COUNT: 14.5 % (ref 11.9–14.6)
GLUCOSE BLD STRIP.AUTO-MCNC: 134 MG/DL (ref 65–100)
GLUCOSE BLD STRIP.AUTO-MCNC: 163 MG/DL (ref 65–100)
GLUCOSE BLD STRIP.AUTO-MCNC: 168 MG/DL (ref 65–100)
GLUCOSE BLD STRIP.AUTO-MCNC: 183 MG/DL (ref 65–100)
GLUCOSE BLD STRIP.AUTO-MCNC: 187 MG/DL (ref 65–100)
GLUCOSE SERPL-MCNC: 187 MG/DL (ref 70–99)
HCT VFR BLD AUTO: 39.7 % (ref 41.1–50.3)
HGB BLD-MCNC: 12.6 G/DL (ref 13.6–17.2)
MCH RBC QN AUTO: 29.2 PG (ref 26.1–32.9)
MCHC RBC AUTO-ENTMCNC: 31.7 G/DL (ref 31.4–35)
MCV RBC AUTO: 91.9 FL (ref 82–102)
NRBC # BLD: 0 K/UL (ref 0–0.2)
PLATELET # BLD AUTO: 141 K/UL (ref 150–450)
PMV BLD AUTO: 10.2 FL (ref 9.4–12.3)
POTASSIUM SERPL-SCNC: 4 MMOL/L (ref 3.5–5.1)
RBC # BLD AUTO: 4.32 M/UL (ref 4.23–5.6)
SERVICE CMNT-IMP: ABNORMAL
SODIUM SERPL-SCNC: 138 MMOL/L (ref 136–145)
WBC # BLD AUTO: 5.9 K/UL (ref 4.3–11.1)

## 2025-06-18 PROCEDURE — 97165 OT EVAL LOW COMPLEX 30 MIN: CPT

## 2025-06-18 PROCEDURE — 6370000000 HC RX 637 (ALT 250 FOR IP): Performed by: FAMILY MEDICINE

## 2025-06-18 PROCEDURE — 2580000003 HC RX 258: Performed by: ORTHOPAEDIC SURGERY

## 2025-06-18 PROCEDURE — 6370000000 HC RX 637 (ALT 250 FOR IP): Performed by: ORTHOPAEDIC SURGERY

## 2025-06-18 PROCEDURE — 6360000002 HC RX W HCPCS: Performed by: ORTHOPAEDIC SURGERY

## 2025-06-18 PROCEDURE — 51798 US URINE CAPACITY MEASURE: CPT

## 2025-06-18 PROCEDURE — 80048 BASIC METABOLIC PNL TOTAL CA: CPT

## 2025-06-18 PROCEDURE — 97161 PT EVAL LOW COMPLEX 20 MIN: CPT

## 2025-06-18 PROCEDURE — 1100000000 HC RM PRIVATE

## 2025-06-18 PROCEDURE — 85027 COMPLETE CBC AUTOMATED: CPT

## 2025-06-18 PROCEDURE — 82962 GLUCOSE BLOOD TEST: CPT

## 2025-06-18 PROCEDURE — 2500000003 HC RX 250 WO HCPCS: Performed by: ORTHOPAEDIC SURGERY

## 2025-06-18 PROCEDURE — G0378 HOSPITAL OBSERVATION PER HR: HCPCS

## 2025-06-18 PROCEDURE — 36415 COLL VENOUS BLD VENIPUNCTURE: CPT

## 2025-06-18 PROCEDURE — 51701 INSERT BLADDER CATHETER: CPT

## 2025-06-18 PROCEDURE — 97112 NEUROMUSCULAR REEDUCATION: CPT

## 2025-06-18 PROCEDURE — 97530 THERAPEUTIC ACTIVITIES: CPT

## 2025-06-18 RX ORDER — 0.9 % SODIUM CHLORIDE 0.9 %
500 INTRAVENOUS SOLUTION INTRAVENOUS ONCE
Status: COMPLETED | OUTPATIENT
Start: 2025-06-18 | End: 2025-06-18

## 2025-06-18 RX ADMIN — LISINOPRIL 2.5 MG: 5 TABLET ORAL at 17:36

## 2025-06-18 RX ADMIN — SODIUM CHLORIDE, PRESERVATIVE FREE 10 ML: 5 INJECTION INTRAVENOUS at 21:07

## 2025-06-18 RX ADMIN — SODIUM CHLORIDE, PRESERVATIVE FREE 10 ML: 5 INJECTION INTRAVENOUS at 08:28

## 2025-06-18 RX ADMIN — BISACODYL 5 MG: 5 TABLET, COATED ORAL at 08:25

## 2025-06-18 RX ADMIN — OXYCODONE 10 MG: 5 TABLET ORAL at 06:02

## 2025-06-18 RX ADMIN — ENOXAPARIN SODIUM 30 MG: 100 INJECTION SUBCUTANEOUS at 08:25

## 2025-06-18 RX ADMIN — ENOXAPARIN SODIUM 30 MG: 100 INJECTION SUBCUTANEOUS at 21:06

## 2025-06-18 RX ADMIN — OXYCODONE 10 MG: 5 TABLET ORAL at 02:04

## 2025-06-18 RX ADMIN — OXYCODONE 10 MG: 5 TABLET ORAL at 11:54

## 2025-06-18 RX ADMIN — PRAMIPEXOLE DIHYDROCHLORIDE 1.5 MG: 0.5 TABLET ORAL at 08:25

## 2025-06-18 RX ADMIN — POLYETHYLENE GLYCOL 3350 17 G: 17 POWDER, FOR SOLUTION ORAL at 08:25

## 2025-06-18 RX ADMIN — OXYCODONE 10 MG: 5 TABLET ORAL at 22:43

## 2025-06-18 RX ADMIN — ACETAMINOPHEN 650 MG: 325 TABLET ORAL at 02:04

## 2025-06-18 RX ADMIN — ACETAMINOPHEN 650 MG: 325 TABLET ORAL at 21:06

## 2025-06-18 RX ADMIN — PRAMIPEXOLE DIHYDROCHLORIDE 1.5 MG: 0.5 TABLET ORAL at 21:06

## 2025-06-18 RX ADMIN — HYDROMORPHONE HYDROCHLORIDE 0.5 MG: 1 INJECTION, SOLUTION INTRAMUSCULAR; INTRAVENOUS; SUBCUTANEOUS at 13:15

## 2025-06-18 RX ADMIN — METFORMIN HYDROCHLORIDE 1000 MG: 500 TABLET ORAL at 08:25

## 2025-06-18 RX ADMIN — ACETAMINOPHEN 650 MG: 325 TABLET ORAL at 06:01

## 2025-06-18 RX ADMIN — SODIUM CHLORIDE 500 ML: 0.9 INJECTION, SOLUTION INTRAVENOUS at 07:45

## 2025-06-18 RX ADMIN — METFORMIN HYDROCHLORIDE 1000 MG: 500 TABLET ORAL at 17:36

## 2025-06-18 RX ADMIN — ATORVASTATIN CALCIUM 20 MG: 20 TABLET, FILM COATED ORAL at 21:06

## 2025-06-18 RX ADMIN — ACETAMINOPHEN 650 MG: 325 TABLET ORAL at 13:43

## 2025-06-18 RX ADMIN — DULOXETINE HYDROCHLORIDE 30 MG: 30 CAPSULE, DELAYED RELEASE ORAL at 08:25

## 2025-06-18 RX ADMIN — CYCLOBENZAPRINE HYDROCHLORIDE 10 MG: 10 TABLET, FILM COATED ORAL at 12:01

## 2025-06-18 ASSESSMENT — PAIN SCALES - GENERAL
PAINLEVEL_OUTOF10: 3
PAINLEVEL_OUTOF10: 9
PAINLEVEL_OUTOF10: 3
PAINLEVEL_OUTOF10: 8
PAINLEVEL_OUTOF10: 8
PAINLEVEL_OUTOF10: 9
PAINLEVEL_OUTOF10: 3
PAINLEVEL_OUTOF10: 7
PAINLEVEL_OUTOF10: 0
PAINLEVEL_OUTOF10: 3
PAINLEVEL_OUTOF10: 3

## 2025-06-18 ASSESSMENT — PAIN DESCRIPTION - DESCRIPTORS
DESCRIPTORS: ACHING;DISCOMFORT
DESCRIPTORS: ACHING
DESCRIPTORS: ACHING;DISCOMFORT
DESCRIPTORS: ACHING
DESCRIPTORS: ACHING;DISCOMFORT

## 2025-06-18 ASSESSMENT — PAIN DESCRIPTION - ORIENTATION
ORIENTATION: POSTERIOR
ORIENTATION: LOWER;MID;UPPER
ORIENTATION: LOWER;MID
ORIENTATION: POSTERIOR
ORIENTATION: POSTERIOR

## 2025-06-18 ASSESSMENT — PAIN DESCRIPTION - LOCATION
LOCATION: BACK

## 2025-06-18 ASSESSMENT — PAIN DESCRIPTION - ONSET
ONSET: GRADUAL

## 2025-06-18 ASSESSMENT — PAIN DESCRIPTION - FREQUENCY
FREQUENCY: INTERMITTENT

## 2025-06-18 ASSESSMENT — PAIN DESCRIPTION - PAIN TYPE
TYPE: SURGICAL PAIN

## 2025-06-18 ASSESSMENT — PAIN - FUNCTIONAL ASSESSMENT
PAIN_FUNCTIONAL_ASSESSMENT: ACTIVITIES ARE NOT PREVENTED
PAIN_FUNCTIONAL_ASSESSMENT: PREVENTS OR INTERFERES SOME ACTIVE ACTIVITIES AND ADLS
PAIN_FUNCTIONAL_ASSESSMENT: PREVENTS OR INTERFERES SOME ACTIVE ACTIVITIES AND ADLS

## 2025-06-18 NOTE — PROGRESS NOTES
ACUTE OCCUPATIONAL THERAPY GOALS:   (Developed with and agreed upon by patient and/or caregiver.)  1. Pt will complete LB ADL I with AE as needed.   2. Pt will complete toileting I with AE as needed.   3. Pt will complete UB ADL I.  4. Pt will tolerate 25 minutes of OT treatment requiring 1-2 breaks as needed.   5. Pt will complete grooming tasks while standing at sink Mod I.  6. Pt will complete functional mobility and/or transfers via  LRAD Mod I.   7. Pt will tolerate BUE exercises to increase strength/endurance for safe, functional transfers and/or functional mobility, and ADL participation.   8. Pt will verbalize and/or demonstrate understanding of spinal precautions during functional activity 100% of the time.      Timeframe: 7 visits     OCCUPATIONAL THERAPY Initial Assessment, Daily Note, and PM       OT Visit Days: 1  Acknowledge Orders  Time  OT Charge Capture  Rehab Caseload Tracker      Arnulfo Wright is a 69 y.o. male   PRIMARY DIAGNOSIS: Lumbar radiculopathy  Spinal stenosis, lumbar region, with neurogenic claudication [M48.062]  Degeneration of intervertebral disc of lumbar region with discogenic back pain and lower extremity pain [M51.362]  Lumbar radiculopathy [M54.16]  Lumbar stenosis with neurogenic claudication [M48.062]  Procedure(s) (LRB):  L2-L4 lumbar laminectomy (N/A)  2 Days Post-Op  Reason for Referral: Generalized Muscle Weakness (M62.81)  Inpatient: Payor: MEDICARE / Plan: MEDICARE PART A AND B / Product Type: *No Product type* /     ASSESSMENT:     REHAB RECOMMENDATIONS:   Recommendation to date pending progress:  Setting:  Home Health Therapy vs. No OT needs    Equipment:    To Be Determined     ASSESSMENT:  Mr. Wright has a significant hx of HTN, DM, mood disorder. Pt was admitted for condition/procedure above. Pt has been on bedrest lying flat since surgery for 48 hours. At functional baseline, pt is fully independent for functional mobility and I/ADLs. Pt enjoys

## 2025-06-18 NOTE — PROGRESS NOTES
ORTHOPAEDIC PROGRESS NOTE    2025  Admit Date: 2025  Admit Diagnosis: Spinal stenosis, lumbar region, with neurogenic claudication [M48.062]  Degeneration of intervertebral disc of lumbar region with discogenic back pain and lower extremity pain [M51.362]  Lumbar radiculopathy [M54.16]  Lumbar stenosis with neurogenic claudication [M48.062]  Procedure: Procedure(s):  L2-L4 lumbar laminectomy  Post Op day: 2 Days Post-Op      Subjective:     Arnulfo Wright is a patient who post op day 2 after L2-4 laminectomy.  Head of bed trial started this morning.  I saw the patient several times today, early this morning before head of bed trials, midday after he had completed a lap around the unit with physical therapy and then just now.  He continues to endorse improvement in his right lower extremity symptoms.  Denies any headaches.  Antony catheter has been removed and they are following the retention protocol.  His pain is controlled and wife is at the bedside.  Drain still connected.    Objective:     Vital Signs:    Blood pressure 127/66, pulse 68, temperature 98.6 °F (37 °C), resp. rate 16, height 1.88 m (6' 2\"), weight 107 kg (236 lb), SpO2 93%.    Temp (24hrs), Av.7 °F (37.1 °C), Min:98.4 °F (36.9 °C), Max:99.1 °F (37.3 °C)      No intake/output data recorded.  1901 -  0700  In: -   Out: 2295 [Urine:2250; Drains:45]    LAB:    Recent Labs     25  0915   HGB 12.6*   WBC 5.9   *       Physical Exam    General:   Alert and oriented. No acute distress  Lungs:  Respirations unlabored.  Extremities: No evidence of cyanosis.     Moves both upper and lower extremities.     Lower Extremities:     HF (L2) KE (L3/4) ADF (L4) EHL (L5) APF (S1)   Right 5 5 5 5 5   Left 5 5 5 5 5     No gross numbness reported on exam.             Output by Drain (mL) 25 0701 - 25 1900 25 190 - 25 0700 25 0701 - 25 1900 25 1901 - 25 0700 25 0701 -

## 2025-06-18 NOTE — PROGRESS NOTES
ACUTE PHYSICAL THERAPY GOALS:   (Developed with and agreed upon by patient and/or caregiver.)  Pt will perform bed mobility with Cornelio while appropriately maintaining spinal precautions in 7 therapy sessions.  Pt will perform sit-to-stand/ stand-to-sit transfers Cornelio in 7 therapy sessions.  Pt will ambulate 500 ft Cornelio with use of LRAD/no device and breaks as needed in 7 therapy sessions.  Pt will tolerate 25 minutes of standing activity with LRAD/no device in 7 therapy sessions.  Pt will negotiate up and down 5 steps Cornelio with use of a handrail in 7 therapy sessions.  Pt will perform standing dynamic balance activities with minimal postural sway in 7 therapy sessions.  Pt will tolerate multiple sets and reps of BLE exercises in 7 therapy sessions.  Pt will accurately verbalize and demonstrate spinal precautions in 7 therapy sessions.     PHYSICAL THERAPY Initial Assessment and PM  (Link to Caseload Tracking: PT Visit Days : 1  Acknowledge Orders  Time In/Out  PT Charge Capture  Rehab Caseload Tracker    Spinal precautions    Arnulfo Wright is a 69 y.o. male   PRIMARY DIAGNOSIS: Lumbar radiculopathy  Spinal stenosis, lumbar region, with neurogenic claudication [M48.062]  Degeneration of intervertebral disc of lumbar region with discogenic back pain and lower extremity pain [M51.362]  Lumbar radiculopathy [M54.16]  Lumbar stenosis with neurogenic claudication [M48.062]  Procedure(s) (LRB):  L2-L4 lumbar laminectomy (N/A)  2 Days Post-Op  Reason for Referral: Other abnormalities of gait and mobility (R26.89)  Inpatient: Payor: MEDICARE / Plan: MEDICARE PART A AND B / Product Type: *No Product type* /     ASSESSMENT:     REHAB RECOMMENDATIONS:   Recommendation to date pending progress:  Setting:  Home Health Therapy    Equipment:    To Be Determined - Has RW at home     ASSESSMENT:  Mr. Wright is a 69 y.o. male presenting to PT s/p the above procedure. At baseline, pt ambulates independently and lives with his

## 2025-06-18 NOTE — PROGRESS NOTES
Instructions/procedure for head of bed advancement today, 6/18/25.     8 AM, head of bed at 15 degrees for 1 hour  9 AM, if no headache, head of bed to 30 degrees for 1 hour  10 AM if no headache, head of bed to 45 degrees for 1 hour  11 AM, if no headache, head of bed to 60 degrees for 1 hour,  12 PM, if no headache sit at edge of bed for duration of patient's comfort, then progress to next step  1 PM, if no headache, sit in chair for 1 hour  2 PM, if no headache, can mobilize and ambulate as tolerated     If persistent headache is encountered patient should be returned to bed flat precautions.

## 2025-06-18 NOTE — PROGRESS NOTES
[facility]ist progress note   Admit Date:  2025  5:15 AM   Name:  Arnulfo Wright   Age:  69 y.o.  Sex:  male  :  1955   MRN:  [id number]   Room:  711/    Presenting/Chief Complaint: No chief complaint on file.    Reason(s) for Admission: Spinal stenosis, lumbar region, with neurogenic claudication [M48.062]  Degeneration of intervertebral disc of lumbar region with discogenic back pain and lower extremity pain [M51.362]  Lumbar radiculopathy [M54.16]     Hospitalists consulted by Priyank Gray* for: Postop medical management    History of Presenting Illness:   Arnulfo Wright is a 69 y.o. male with history of hypertension, hyperlipidemia, diabetes mellitus, mood disorder admitted by orthospine for lumbar spinal stenosis, status post L2-L4 laminectomy complicated by incidental intraoperative durotomy repaired with patch and DuraSeal on .  Hospitalist consulted for postop medical management.    Subjective 25  Patient is seen and examined at the bedside.  Reports overall feeling better.  Denies nausea, vomiting, chest pain or palpitation.  Denies shortness of breath.    Blood glucose optimally controlled    Assessment & Plan:     Lumbar spinal stenosis:  Status post L2-L4 laminectomy complicated by incidental intraoperative durotomy repaired with patch and DuraSeal on   Management as per primary  PT/OT as per primary  Patient required in and out catheterization x 2, if unable to urinate then will recommend to place Antony catheter.    Diabetes mellitus:  Continue home metformin  Continue Lantus 10 unit daily  Continue medium dose sliding scale  Adjust insulin regimen accordingly    Hypertension/hyperlipidemia:  Continue lisinopril and atorvastatin    Mood disorder:  Continue Cymbalta    Anticipated Discharge Arrangements:   Defer to primary team    PT/OT evals ordered?  Defer to primary team  Diet:  ADULT ORAL NUTRITION SUPPLEMENT; AM Snack, PM Snack; Other

## 2025-06-19 VITALS
HEART RATE: 69 BPM | DIASTOLIC BLOOD PRESSURE: 75 MMHG | BODY MASS INDEX: 30.29 KG/M2 | OXYGEN SATURATION: 91 % | TEMPERATURE: 98.4 F | SYSTOLIC BLOOD PRESSURE: 132 MMHG | HEIGHT: 74 IN | RESPIRATION RATE: 14 BRPM | WEIGHT: 236 LBS

## 2025-06-19 LAB
GLUCOSE BLD STRIP.AUTO-MCNC: 138 MG/DL (ref 65–100)
GLUCOSE BLD STRIP.AUTO-MCNC: 164 MG/DL (ref 65–100)
GLUCOSE BLD STRIP.AUTO-MCNC: 175 MG/DL (ref 65–100)
SERVICE CMNT-IMP: ABNORMAL

## 2025-06-19 PROCEDURE — 6370000000 HC RX 637 (ALT 250 FOR IP): Performed by: ORTHOPAEDIC SURGERY

## 2025-06-19 PROCEDURE — 97530 THERAPEUTIC ACTIVITIES: CPT

## 2025-06-19 PROCEDURE — 6360000002 HC RX W HCPCS: Performed by: ORTHOPAEDIC SURGERY

## 2025-06-19 PROCEDURE — 2500000003 HC RX 250 WO HCPCS: Performed by: ORTHOPAEDIC SURGERY

## 2025-06-19 PROCEDURE — 6370000000 HC RX 637 (ALT 250 FOR IP): Performed by: FAMILY MEDICINE

## 2025-06-19 PROCEDURE — 51798 US URINE CAPACITY MEASURE: CPT

## 2025-06-19 PROCEDURE — 82962 GLUCOSE BLOOD TEST: CPT

## 2025-06-19 RX ORDER — POLYETHYLENE GLYCOL 3350 17 G/17G
17 POWDER, FOR SOLUTION ORAL DAILY PRN
Qty: 527 G | Refills: 1 | Status: SHIPPED | OUTPATIENT
Start: 2025-06-19 | End: 2025-07-19

## 2025-06-19 RX ORDER — OXYCODONE HYDROCHLORIDE 5 MG/1
5 TABLET ORAL EVERY 4 HOURS PRN
Qty: 30 TABLET | Refills: 0 | Status: SHIPPED | OUTPATIENT
Start: 2025-06-19 | End: 2025-06-22

## 2025-06-19 RX ORDER — TAMSULOSIN HYDROCHLORIDE 0.4 MG/1
0.8 CAPSULE ORAL DAILY
Status: DISCONTINUED | OUTPATIENT
Start: 2025-06-19 | End: 2025-06-19 | Stop reason: HOSPADM

## 2025-06-19 RX ORDER — BISACODYL 5 MG/1
5 TABLET, DELAYED RELEASE ORAL DAILY PRN
Qty: 20 TABLET | Refills: 0 | Status: SHIPPED | OUTPATIENT
Start: 2025-06-19

## 2025-06-19 RX ORDER — TAMSULOSIN HYDROCHLORIDE 0.4 MG/1
0.8 CAPSULE ORAL DAILY
Qty: 30 CAPSULE | Refills: 3 | Status: SHIPPED | OUTPATIENT
Start: 2025-06-20

## 2025-06-19 RX ADMIN — OXYCODONE 10 MG: 5 TABLET ORAL at 08:02

## 2025-06-19 RX ADMIN — TAMSULOSIN HYDROCHLORIDE 0.8 MG: 0.4 CAPSULE ORAL at 11:04

## 2025-06-19 RX ADMIN — ACETAMINOPHEN 650 MG: 325 TABLET ORAL at 03:27

## 2025-06-19 RX ADMIN — POLYETHYLENE GLYCOL 3350 17 G: 17 POWDER, FOR SOLUTION ORAL at 07:57

## 2025-06-19 RX ADMIN — ENOXAPARIN SODIUM 30 MG: 100 INJECTION SUBCUTANEOUS at 07:56

## 2025-06-19 RX ADMIN — PRAMIPEXOLE DIHYDROCHLORIDE 1.5 MG: 0.5 TABLET ORAL at 07:56

## 2025-06-19 RX ADMIN — DULOXETINE HYDROCHLORIDE 30 MG: 30 CAPSULE, DELAYED RELEASE ORAL at 07:56

## 2025-06-19 RX ADMIN — ACETAMINOPHEN 650 MG: 325 TABLET ORAL at 15:28

## 2025-06-19 RX ADMIN — OXYCODONE 10 MG: 5 TABLET ORAL at 12:31

## 2025-06-19 RX ADMIN — METFORMIN HYDROCHLORIDE 1000 MG: 500 TABLET ORAL at 17:31

## 2025-06-19 RX ADMIN — METFORMIN HYDROCHLORIDE 1000 MG: 500 TABLET ORAL at 07:56

## 2025-06-19 RX ADMIN — ACETAMINOPHEN 650 MG: 325 TABLET ORAL at 07:56

## 2025-06-19 RX ADMIN — BISACODYL 5 MG: 5 TABLET, COATED ORAL at 07:57

## 2025-06-19 RX ADMIN — SODIUM CHLORIDE, PRESERVATIVE FREE 10 ML: 5 INJECTION INTRAVENOUS at 07:57

## 2025-06-19 ASSESSMENT — PAIN DESCRIPTION - FREQUENCY
FREQUENCY: INTERMITTENT
FREQUENCY: INTERMITTENT

## 2025-06-19 ASSESSMENT — PAIN SCALES - GENERAL
PAINLEVEL_OUTOF10: 7
PAINLEVEL_OUTOF10: 3
PAINLEVEL_OUTOF10: 8
PAINLEVEL_OUTOF10: 2

## 2025-06-19 ASSESSMENT — PAIN DESCRIPTION - ONSET
ONSET: GRADUAL
ONSET: GRADUAL

## 2025-06-19 ASSESSMENT — PAIN DESCRIPTION - PAIN TYPE
TYPE: SURGICAL PAIN
TYPE: SURGICAL PAIN

## 2025-06-19 ASSESSMENT — PAIN DESCRIPTION - ORIENTATION
ORIENTATION: POSTERIOR
ORIENTATION: POSTERIOR

## 2025-06-19 ASSESSMENT — PAIN - FUNCTIONAL ASSESSMENT
PAIN_FUNCTIONAL_ASSESSMENT: ACTIVITIES ARE NOT PREVENTED
PAIN_FUNCTIONAL_ASSESSMENT: ACTIVITIES ARE NOT PREVENTED

## 2025-06-19 ASSESSMENT — PAIN DESCRIPTION - LOCATION
LOCATION: BACK
LOCATION: BACK

## 2025-06-19 ASSESSMENT — PAIN DESCRIPTION - DESCRIPTORS
DESCRIPTORS: ACHING;DISCOMFORT
DESCRIPTORS: ACHING;DISCOMFORT

## 2025-06-19 NOTE — PROGRESS NOTES
ACUTE PHYSICAL THERAPY GOALS:   (Developed with and agreed upon by patient and/or caregiver.)  Pt will perform bed mobility with Cornelio while appropriately maintaining spinal precautions in 7 therapy sessions.  Pt will perform sit-to-stand/ stand-to-sit transfers Cornelio in 7 therapy sessions.  Pt will ambulate 500 ft Cornelio with use of LRAD/no device and breaks as needed in 7 therapy sessions.  Pt will tolerate 25 minutes of standing activity with LRAD/no device in 7 therapy sessions.  Pt will negotiate up and down 5 steps Cornelio with use of a handrail in 7 therapy sessions.  Pt will perform standing dynamic balance activities with minimal postural sway in 7 therapy sessions.  Pt will tolerate multiple sets and reps of BLE exercises in 7 therapy sessions.  Pt will accurately verbalize and demonstrate spinal precautions in 7 therapy sessions.     PHYSICAL THERAPY: Daily Note AM   (Link to Caseload Tracking: PT Visit Days : 2  Time In/Out PT Charge Capture  Rehab Caseload Tracker  Orders    Arnulfo Wright is a 69 y.o. male   PRIMARY DIAGNOSIS: Lumbar radiculopathy  Spinal stenosis, lumbar region, with neurogenic claudication [M48.062]  Degeneration of intervertebral disc of lumbar region with discogenic back pain and lower extremity pain [M51.362]  Lumbar radiculopathy [M54.16]  Lumbar stenosis with neurogenic claudication [M48.062]  Procedure(s) (LRB):  L2-L4 lumbar laminectomy (N/A)  3 Days Post-Op  Inpatient: Payor: MEDICARE / Plan: MEDICARE PART A AND B / Product Type: *No Product type* /     ASSESSMENT:     REHAB RECOMMENDATIONS:   Recommendation to date pending progress:  Setting:  Home Health Therapy    Equipment:    Rolling Walker     ASSESSMENT:  Mr. Wright continues to make good progress towards PT goals as noted by improved activity tolerance, strength, and overall mobility. Patient ambulated 250' with rolling walker and no LOB noted. Patient recalled spinal precautions and verbalized understanding of log

## 2025-06-19 NOTE — PROGRESS NOTES
ORTHOPAEDIC PROGRESS NOTE    2025  Admit Date: 2025  Admit Diagnosis: Spinal stenosis, lumbar region, with neurogenic claudication [M48.062]  Degeneration of intervertebral disc of lumbar region with discogenic back pain and lower extremity pain [M51.362]  Lumbar radiculopathy [M54.16]  Lumbar stenosis with neurogenic claudication [M48.062]  Procedure: Procedure(s):  L2-L4 lumbar laminectomy  Post Op day: 3 Days Post-Op      Subjective:     Arnulfo Wright is a patient who post op day 3 after L2-4 laminectomy.  Patient did very well yesterday walking with therapy.  Rested well overnight.  Unfortunately has been unable to urinate since Antony catheter was removed yesterday afternoon.  Required straight catheterization last night.  He tells me this morning that he feels like he is almost able to urinate but still having some difficulty.  Denies any numbness or tingling.  Denies any leg weakness.  Reiterates that his extremity pain from before surgery is markedly better.  Wife at the bedside.    Objective:     Vital Signs:    Blood pressure 132/75, pulse 69, temperature 98.4 °F (36.9 °C), temperature source Oral, resp. rate 14, height 1.88 m (6' 2\"), weight 107 kg (236 lb), SpO2 91%.    Temp (24hrs), Av.3 °F (36.8 °C), Min:97.9 °F (36.6 °C), Max:98.6 °F (37 °C)      No intake/output data recorded.   1901 -  0700  In: -   Out: 2260 [Urine:2200; Drains:60]    LAB:    Recent Labs     25  0915   HGB 12.6*   WBC 5.9   *       Physical Exam    General:   Alert and oriented. No acute distress  Lungs:  Respirations unlabored.  Extremities: No evidence of cyanosis.     Moves both upper and lower extremities.     Lower Extremities:     HF (L2) KE (L3/4) ADF (L4) EHL (L5) APF (S1)   Right 5 5 5 5 5   Left 5 5 5 5 5     No gross numbness reported on exam.      Mild bloody staining of dressing      Output by Drain (mL) 25 0701 - 25 1900 25 1901 - 25 0700 25

## 2025-06-19 NOTE — PROGRESS NOTES
[facility] progress note   Admit Date:  2025  5:15 AM   Name:  Arnulfo Wright   Age:  69 y.o.  Sex:  male  :  1955   MRN:  [id number]   Room:  1/    Presenting/Chief Complaint: No chief complaint on file.    Reason(s) for Admission: Spinal stenosis, lumbar region, with neurogenic claudication [M48.062]  Degeneration of intervertebral disc of lumbar region with discogenic back pain and lower extremity pain [M51.362]  Lumbar radiculopathy [M54.16]  Lumbar stenosis with neurogenic claudication [M48.062]     Hospitalists consulted by Priyank Gray* for: Postop medical management    History of Presenting Illness:   Arnulfo Wright is a 69 y.o. male with history of hypertension, hyperlipidemia, diabetes mellitus, mood disorder admitted by orthospine for lumbar spinal stenosis, status post L2-L4 laminectomy complicated by incidental intraoperative durotomy repaired with patch and DuraSeal on .  Hospitalist consulted for postop medical management.    Subjective 25  Patient is seen and examined at the bedside.  Reports feeling better.  Pain optimally controlled.  Denies nausea, vomiting, chest pain or palpitation.    Voiding trial today.     Assessment & Plan:     Lumbar spinal stenosis:  Status post L2-L4 laminectomy complicated by incidental intraoperative durotomy repaired with patch and DuraSeal on   Management as per primary  PT/OT as per primary  Voiding trial today, if unable to urinate then will recommend to place Antony catheter.  Started on Flomax    Diabetes mellitus:  Continue home metformin  Continue Lantus 10 unit daily  Continue medium dose sliding scale  Adjust insulin regimen accordingly    Hypertension/hyperlipidemia:  Continue lisinopril and atorvastatin    Mood disorder:  Continue Cymbalta    Anticipated Discharge Arrangements:   Defer to primary team    PT/OT evals ordered?  Defer to primary team  Diet:  ADULT ORAL NUTRITION SUPPLEMENT; AM

## 2025-06-19 NOTE — PROGRESS NOTES
Patient unable to void since removal of wild 6/17 at 1457. Bladder scan showed 688 mL. Straight cath x1 with 550 mL output.

## 2025-06-19 NOTE — CARE COORDINATION
CM met with pt/spouse at the bedside to discuss therapy recommendation for HH services and a RW at [place].  Demographics, insurance and PCP confirmed.  Pt is agreeable with  services and from a list of agencies provided he requested a referral to [facility].  HH SN/PT/OT referral submitted and pt accepted for services.  Pt confirmed that he has a rolling walker and rollator at home.  No additional dc needs or concerns identified or reported.         06/17/25 [id number]   Service Assessment   Patient Orientation Alert and Oriented   Cognition Alert   History Provided By Patient;Spouse;Medical Record   Primary Caregiver Self   Accompanied By/Relationship spouse   Support Systems Spouse/Significant Other;Children;Family Members   Patient's Healthcare Decision Maker is: Legal Next of Kin   PCP Verified by CM Yes   Last Visit to PCP Within last 6 months  (3/7/2025)   Prior Functional Level Independent in ADLs/IADLs   Current Functional Level   (TBD by therapy evals-pt currently on bedrest)   Can patient return to prior living arrangement Unknown at present   Ability to make needs known: Good   Family able to assist with home care needs: Yes   Would you like for me to discuss the discharge plan with any other family members/significant others, and if so, who? Yes  (spouse)   Financial Resources Medicare   Social/Functional History   Lives With Spouse   Type of Home House   Home Equipment Walker - Rolling;Walker - 4-Wheeled   Prior Level of Assist for ADLs Independent   Prior Level of Assist for Homemaking Independent   Homemaking Responsibilities Yes   Ambulation Assistance Independent   Prior Level of Assist for Transfers Independent   Occupation Retired   Discharge Planning   Type of Residence House   Living Arrangements Spouse/Significant Other   Current Services Prior To Admission None   Potential Assistance Needed Home Care   Potential DME Needed  --    DME Ordered? No   Potential Assistance Purchasing Medications

## 2025-06-20 ENCOUNTER — TELEPHONE (OUTPATIENT)
Age: 70
End: 2025-06-20

## 2025-06-20 NOTE — PROGRESS NOTES
Spoke to the patient via phone this afternoon.  He was discharged home from the hospital yesterday afternoon with a Antony catheter in place given some urinary retention.  Started on Flomax while admitted.  Patient reports that he is doing well.  Has minimal back soreness, states that he was enjoying some time sitting on his porch.  Still enjoying the lack of any lower extremity radicular symptoms following surgery.  Felt like he had a strong desire to urinate earlier today when his Antony tube was kinked for a bit.  This resolved shortly thereafter.  I explained to him that our plan was going to be to either come into our office next week to have the Antony catheter removed or ideally have him see urology as an outpatient.  I told him that I would try to make the necessary arrangements over the weekend to have him seen next week in the outpatient urology office.  Vocalized understanding and remained very appreciative.  Assured him to call the on- over the weekend if there are any issues.  Left at that we would touch base at the first of the week.

## 2025-06-23 ENCOUNTER — TELEPHONE (OUTPATIENT)
Dept: ORTHOPEDIC SURGERY | Age: 70
End: 2025-06-23

## 2025-06-23 DIAGNOSIS — Z98.890 STATUS POST LUMBAR LAMINECTOMY: Primary | ICD-10-CM

## 2025-06-23 NOTE — TELEPHONE ENCOUNTER
I called and spoke to Fatmata at [facility]. We are seeing the patient in office tomorrow to have the cath removed.

## 2025-06-23 NOTE — TELEPHONE ENCOUNTER
I spoke with Dr. Gray and he wants the patient to see Urology ASAP. Dr. Gray spoke to their providers last week and I sent an urgent referral.

## 2025-06-24 ENCOUNTER — TELEPHONE (OUTPATIENT)
Dept: ORTHOPEDIC SURGERY | Age: 70
End: 2025-06-24

## 2025-06-24 NOTE — TELEPHONE ENCOUNTER
PT sophie completed today. He is asking for verbal orders to see 2 x week x 3-4 weeks.     There is some old, dried drainage on the dressing. Do you want it changed or wait until his appt. Please give Melvin a call.

## 2025-06-25 ENCOUNTER — OFFICE VISIT (OUTPATIENT)
Dept: UROLOGY | Age: 70
End: 2025-06-25
Payer: MEDICARE

## 2025-06-25 DIAGNOSIS — N13.8 BPH WITH OBSTRUCTION/LOWER URINARY TRACT SYMPTOMS: ICD-10-CM

## 2025-06-25 DIAGNOSIS — R33.9 URINARY RETENTION: Primary | ICD-10-CM

## 2025-06-25 DIAGNOSIS — N40.1 BPH WITH OBSTRUCTION/LOWER URINARY TRACT SYMPTOMS: ICD-10-CM

## 2025-06-25 PROCEDURE — 1159F MED LIST DOCD IN RCRD: CPT | Performed by: NURSE PRACTITIONER

## 2025-06-25 PROCEDURE — 1111F DSCHRG MED/CURRENT MED MERGE: CPT | Performed by: NURSE PRACTITIONER

## 2025-06-25 PROCEDURE — 1160F RVW MEDS BY RX/DR IN RCRD: CPT | Performed by: NURSE PRACTITIONER

## 2025-06-25 PROCEDURE — 1036F TOBACCO NON-USER: CPT | Performed by: NURSE PRACTITIONER

## 2025-06-25 PROCEDURE — 1123F ACP DISCUSS/DSCN MKR DOCD: CPT | Performed by: NURSE PRACTITIONER

## 2025-06-25 PROCEDURE — G8427 DOCREV CUR MEDS BY ELIG CLIN: HCPCS | Performed by: NURSE PRACTITIONER

## 2025-06-25 PROCEDURE — 99204 OFFICE O/P NEW MOD 45 MIN: CPT | Performed by: NURSE PRACTITIONER

## 2025-06-25 PROCEDURE — G8417 CALC BMI ABV UP PARAM F/U: HCPCS | Performed by: NURSE PRACTITIONER

## 2025-06-25 PROCEDURE — 3017F COLORECTAL CA SCREEN DOC REV: CPT | Performed by: NURSE PRACTITIONER

## 2025-06-25 ASSESSMENT — ENCOUNTER SYMPTOMS
SKIN LESIONS: 0
SHORTNESS OF BREATH: 0
ABDOMINAL PAIN: 0
WHEEZING: 0
HEARTBURN: 0
VOMITING: 0
INDIGESTION: 0
BACK PAIN: 0
EYE DISCHARGE: 0
CONSTIPATION: 0
EYE PAIN: 0
BLOOD IN STOOL: 0
DIARRHEA: 0
COUGH: 0
NAUSEA: 0

## 2025-06-25 NOTE — PROGRESS NOTES
Spoke to patient via phone this afternoon. He had his indwelling Antony catheter removed at urology appointment this morning. He states that since that time he has urinated volitionally several times. He is doing very well at home. He tells me he has had several regular bowel movements with no difficulty. Denies any weakness, numbness, tingling. Still enjoying the fact that his preoperative leg pain is gone. Home health services coming out to the house and he is avoiding any excessive bending, lifting, twisting.    Advised patient to call us with any issues. He has a scheduled postop appointment next week.

## 2025-06-25 NOTE — PROGRESS NOTES
[facility]  [address] [address]  [phone]          Arnulfo Wright  : 1955    Chief Complaint   Patient presents with    New Patient    Urinary Retention          HPI     Arnulfo Wright is a 69 y.o. male who underwent L2-L4 lumbar laminectomy on 25, developed AUR. He has Antony catheter. Here for TOV.     No prior h/o AUR. He is now on tamsulosin 0.4 mg PO daily.     PSA 2.210 on 23    No personal or family h/o urological CA. Non-smoker.       Past Medical History:   Diagnosis Date    Diabetes mellitus (HCC)     oral reliant, avg bs 110, denies s/s hypo    DVT, lower extremity (HCC)     right leg    HLD (hyperlipidemia)     Malaria     JUDIE on CPAP     Osteoarthritis     PONV (postoperative nausea and vomiting)     RLS (restless legs syndrome)     Sarcoidosis      Past Surgical History:   Procedure Laterality Date    COLONOSCOPY      x2    LAMINECTOMY N/A 2025    L2-L4 lumbar laminectomy performed by Priyank Gray Jr., MD at [facility] MAIN OR    LIPOMA RESECTION      on back    LUMBAR DISCECTOMY      VARICOSE VEIN SURGERY Bilateral     WISDOM TOOTH EXTRACTION       Current Outpatient Medications   Medication Sig Dispense Refill    bisacodyl (DULCOLAX) 5 MG EC tablet Take 1 tablet by mouth daily as needed for Constipation 20 tablet 0    polyethylene glycol (GLYCOLAX) 17 g packet Take 1 packet by mouth daily as needed for Constipation 527 g 1    tamsulosin (FLOMAX) 0.4 MG capsule Take 2 capsules by mouth daily 30 capsule 3    TRULICITY 0.75 MG/0.5ML SOAJ SC injection 0.5 mLs once a week Takes every       DULoxetine (CYMBALTA) 30 MG extended release capsule Take 1 capsule by mouth daily (Patient taking differently: Take 1 capsule by mouth every morning) 90 capsule 3    simvastatin (ZOCOR) 20 MG tablet Take 1 tablet by mouth nightly      pramipexole (MIRAPEX) 1.5 MG tablet Take 1 tablet by mouth 2 times daily

## 2025-06-30 ENCOUNTER — OFFICE VISIT (OUTPATIENT)
Age: 70
End: 2025-06-30

## 2025-06-30 DIAGNOSIS — M54.16 LUMBAR RADICULOPATHY: Primary | ICD-10-CM

## 2025-06-30 PROCEDURE — 99024 POSTOP FOLLOW-UP VISIT: CPT | Performed by: ORTHOPAEDIC SURGERY

## 2025-07-01 ENCOUNTER — TELEPHONE (OUTPATIENT)
Dept: ORTHOPEDIC SURGERY | Age: 70
End: 2025-07-01

## 2025-07-01 NOTE — TELEPHONE ENCOUNTER
I called and answered wound care questions. His wound was healed yesterday in office. I recommended he keep it dry and wear the tegaderm for another week.

## 2025-07-03 NOTE — PROGRESS NOTES
Name: Arnulfo Wright  YOB: 1955  Gender: male  MRN: [id number]  Age: 70 y.o.    Chief Complaint: post op    Assessment/Plan at Previous Visit: post op    History of Present Illness  The patient is a 70-year-old male who is postop after an L2-4 laminectomy on 06/16/2025. There was an incidental intraoperative durotomy, and he experienced urinary retention afterwards. The durotomy was treated with patch and seal with HOB flat bedrest. Urinary retention was managed with an indwelling Antony catheter. He followed up with urology 1 week after surgery, and the Antony was removed. Since then, he has been voiding normally with no issues. He is very happy overall and reports a 100% improvement in his right lower extremity symptoms and pain compared to pre-surgery. He has also been working with home therapy. He is accompanied by his wife, Maria R.               Medications:       Current Outpatient Medications:     bisacodyl (DULCOLAX) 5 MG EC tablet, Take 1 tablet by mouth daily as needed for Constipation, Disp: 20 tablet, Rfl: 0    polyethylene glycol (GLYCOLAX) 17 g packet, Take 1 packet by mouth daily as needed for Constipation, Disp: 527 g, Rfl: 1    tamsulosin (FLOMAX) 0.4 MG capsule, Take 2 capsules by mouth daily, Disp: 30 capsule, Rfl: 3    TRULICITY 0.75 MG/0.5ML SOAJ SC injection, 0.5 mLs once a week Takes every Sunday, Disp: , Rfl:     DULoxetine (CYMBALTA) 30 MG extended release capsule, Take 1 capsule by mouth daily (Patient taking differently: Take 1 capsule by mouth every morning), Disp: 90 capsule, Rfl: 3    simvastatin (ZOCOR) 20 MG tablet, Take 1 tablet by mouth nightly, Disp: , Rfl:     pramipexole (MIRAPEX) 1.5 MG tablet, Take 1 tablet by mouth 2 times daily, Disp: , Rfl:     metFORMIN (GLUCOPHAGE) 1000 MG tablet, Take 1 tablet by mouth 2 times daily, Disp: , Rfl:     lisinopril (PRINIVIL;ZESTRIL) 2.5 MG tablet, Take 1 tablet by mouth every evening, Disp: , Rfl:     PX Lancets MicroThin

## 2025-07-11 ENCOUNTER — EVALUATION (OUTPATIENT)
Age: 70
End: 2025-07-11
Payer: MEDICARE

## 2025-07-11 DIAGNOSIS — M62.81 MUSCLE WEAKNESS (GENERALIZED): Primary | ICD-10-CM

## 2025-07-11 DIAGNOSIS — M54.16 LUMBAR RADICULOPATHY: ICD-10-CM

## 2025-07-11 DIAGNOSIS — G89.29 CHRONIC BILATERAL LOW BACK PAIN WITHOUT SCIATICA: ICD-10-CM

## 2025-07-11 DIAGNOSIS — M54.50 CHRONIC BILATERAL LOW BACK PAIN WITHOUT SCIATICA: ICD-10-CM

## 2025-07-11 DIAGNOSIS — Z74.09 IMPAIRED FUNCTIONAL MOBILITY, BALANCE, GAIT, AND ENDURANCE: ICD-10-CM

## 2025-07-11 PROCEDURE — 97161 PT EVAL LOW COMPLEX 20 MIN: CPT | Performed by: PHYSICAL THERAPIST

## 2025-07-11 PROCEDURE — 97110 THERAPEUTIC EXERCISES: CPT | Performed by: PHYSICAL THERAPIST

## 2025-07-11 NOTE — PROGRESS NOTES
per week   Interventions may include but are not limited to:   (33160) Therapeutic exercise to develop ROM, strength, endurance and flexibility  (18429) Therapeutic activities using dynamic activities to improve function  (25981) Gait training to address mechanics, proper step length and weight shifting to improve household and community mobility as well as overall safety with ADLs  (01689) Manual therapy techniques to improve joint and/or soft tissue mobility, ROM, and function as well as helping to decrease pain/spasms and swelling  (19911) Neuromuscular reeducation addressing impaired balance, coordination, kinesthetic sense, posture and proprioception  (97430) Self-care/home management training to improve activities of daily living skills and compensatory techniques to achieve independence  (20560/20561) Dry needling for the management of neuromusculoskeletal pain and movement impairment  Modalities prn to address pain, spasms, and swelling: (27238/) Electrical stimulation- unattended    The referring medical provider has reviewed and approved this evaluation and plan of care as noted by the electronic signature attached to note.    GOALS   Short term goals to be met by 8/22/2025  (6 weeks):  1. Pt will have a MMT improvement in strength to lumbar paraspinals and B LE by 1/2 grade to allow for improved ADL performance.   2. Pt will report a 30% - 50% improvement in back pain with self care activities, ADL's   3. Pt will report/demonstrate an improvement of standing tolerance to 60 minutes or greater to allow for ADL's such as cooking, washing, personal care.  4. Pt will report/demonstrate an improvement in sitting and or driving tolerance by 60 minutes or greater  to allow for ADL's, self care activities   5. Pt will tolerate improved walking tolerance by 60 minutes or greater to allow for grocery shopping and community independence.      Long term goals to be met by 10/9/2025 (90 days):  1. Pt will report

## 2025-07-16 ENCOUNTER — TREATMENT (OUTPATIENT)
Age: 70
End: 2025-07-16
Payer: MEDICARE

## 2025-07-16 DIAGNOSIS — M62.81 MUSCLE WEAKNESS (GENERALIZED): Primary | ICD-10-CM

## 2025-07-16 DIAGNOSIS — Z74.09 IMPAIRED FUNCTIONAL MOBILITY, BALANCE, GAIT, AND ENDURANCE: ICD-10-CM

## 2025-07-16 DIAGNOSIS — G89.29 CHRONIC RIGHT-SIDED LOW BACK PAIN WITH RIGHT-SIDED SCIATICA: ICD-10-CM

## 2025-07-16 DIAGNOSIS — G89.29 CHRONIC BILATERAL LOW BACK PAIN WITHOUT SCIATICA: ICD-10-CM

## 2025-07-16 DIAGNOSIS — R68.89 DECREASED ACTIVITY TOLERANCE: ICD-10-CM

## 2025-07-16 DIAGNOSIS — M54.41 CHRONIC RIGHT-SIDED LOW BACK PAIN WITH RIGHT-SIDED SCIATICA: ICD-10-CM

## 2025-07-16 DIAGNOSIS — M54.50 CHRONIC BILATERAL LOW BACK PAIN WITHOUT SCIATICA: ICD-10-CM

## 2025-07-16 DIAGNOSIS — M54.16 LUMBAR RADICULOPATHY: ICD-10-CM

## 2025-07-16 DIAGNOSIS — Z78.9 DECREASED ACTIVITIES OF DAILY LIVING (ADL): ICD-10-CM

## 2025-07-16 PROCEDURE — 97110 THERAPEUTIC EXERCISES: CPT | Performed by: PHYSICAL THERAPIST

## 2025-07-16 NOTE — PROGRESS NOTES
[facility]  [address] [facility] [address] [address]  Dept: [phone]      Physical Therapy Daily Note     Referring MD: Priyank Gray *  Diagnosis:     ICD-10-CM    1. Muscle weakness (generalized)  M62.81       2. Impaired functional mobility, balance, gait, and endurance  Z74.09       3. Chronic bilateral low back pain without sciatica  M54.50     G89.29       4. Lumbar radiculopathy [M54.16]  M54.16       5. Decreased activity tolerance  R68.89       6. Chronic right-sided low back pain with right-sided sciatica  M54.41     G89.29       7. Decreased activities of daily living (ADL)  Z78.9          Spine Surgery Date (if applicable):   Prior Spine Surgery: lumbar discectomy in 1996. L2-4 laminectomy on 06/16/2025.   Prior Orthopedic Surgeries:   Therapy precautions: Back precautions - No BLT's   Co-morbidities affecting plan of care: DM type 2    Payor: Payor: MEDICARE /  /  /     Billing pattern: Government- total time   Total Timed Procedure Codes: 40 min   Total Time: 40 min    Modifier needed: No  Episode visit count:  3     PERTINENT MEDICAL HISTORY   Medications: reviewed in chart  Past medical and surgical history:   Past Medical History:   Diagnosis Date    Diabetes mellitus (HCC)     oral reliant, avg bs 110, denies s/s hypo    DVT, lower extremity (HCC) 2024    right leg    HLD (hyperlipidemia)     Malaria 1965    JUDIE on CPAP     Osteoarthritis     PONV (postoperative nausea and vomiting)     RLS (restless legs syndrome)     Sarcoidosis       Past Surgical History:   Procedure Laterality Date    COLONOSCOPY      x2    LAMINECTOMY N/A 6/16/2025    L2-L4 lumbar laminectomy performed by Priyank Gray Jr., MD at [facility] MAIN OR    LIPOMA RESECTION      on back    LUMBAR DISCECTOMY  1996    VARICOSE VEIN SURGERY Bilateral 2010    WISDOM TOOTH EXTRACTION       Allergies: No Known Allergies   Diagnostic exams (per chart review): Impression (before surgery)  1.  Lumbar

## 2025-07-17 ENCOUNTER — CLINICAL DOCUMENTATION (OUTPATIENT)
Age: 70
End: 2025-07-17

## 2025-07-17 ENCOUNTER — TREATMENT (OUTPATIENT)
Age: 70
End: 2025-07-17

## 2025-07-17 DIAGNOSIS — G89.29 CHRONIC BILATERAL LOW BACK PAIN WITHOUT SCIATICA: ICD-10-CM

## 2025-07-17 DIAGNOSIS — M54.50 CHRONIC BILATERAL LOW BACK PAIN WITHOUT SCIATICA: ICD-10-CM

## 2025-07-17 DIAGNOSIS — R68.89 DECREASED ACTIVITY TOLERANCE: ICD-10-CM

## 2025-07-17 DIAGNOSIS — Z74.09 IMPAIRED FUNCTIONAL MOBILITY, BALANCE, GAIT, AND ENDURANCE: ICD-10-CM

## 2025-07-17 DIAGNOSIS — M62.81 MUSCLE WEAKNESS (GENERALIZED): Primary | ICD-10-CM

## 2025-07-17 DIAGNOSIS — M54.16 LUMBAR RADICULOPATHY: Primary | ICD-10-CM

## 2025-07-17 RX ORDER — SULFAMETHOXAZOLE AND TRIMETHOPRIM 800; 160 MG/1; MG/1
1 TABLET ORAL 2 TIMES DAILY
Qty: 28 TABLET | Refills: 0 | Status: SHIPPED | OUTPATIENT
Start: 2025-07-17 | End: 2025-07-31

## 2025-07-17 NOTE — PROGRESS NOTES
[facility]  [address] [facility]  [place] [address]  Dept: [phone]      Physical Therapy Daily Note     Referring MD: Priyank Gray *  Diagnosis:     ICD-10-CM    1. Muscle weakness (generalized)  M62.81       2. Impaired functional mobility, balance, gait, and endurance  Z74.09       3. Chronic bilateral low back pain without sciatica  M54.50     G89.29       4. Decreased activity tolerance  R68.89          Spine Surgery Date (if applicable):   Prior Spine Surgery: lumbar discectomy in 1996. L2-4 laminectomy on 06/16/2025.   Prior Orthopedic Surgeries:   Therapy precautions: Back precautions - No BLT's   Co-morbidities affecting plan of care: DM type 2    Payor: Payor: MEDICARE /  /  /     Billing pattern: Government- total time   Total Timed Procedure Codes: 40 min   Total Time: 40 min    Modifier needed: No  Episode visit count:  4     PERTINENT MEDICAL HISTORY   Medications: reviewed in chart  Past medical and surgical history:   Past Medical History:   Diagnosis Date    Diabetes mellitus (HCC)     oral reliant, avg bs 110, denies s/s hypo    DVT, lower extremity (HCC) 2024    right leg    HLD (hyperlipidemia)     Malaria 1965    JUDIE on CPAP     Osteoarthritis     PONV (postoperative nausea and vomiting)     RLS (restless legs syndrome)     Sarcoidosis       Past Surgical History:   Procedure Laterality Date    COLONOSCOPY      x2    LAMINECTOMY N/A 6/16/2025    L2-L4 lumbar laminectomy performed by Priyank Gray Jr., MD at [facility] MAIN OR    LIPOMA RESECTION      on back    LUMBAR DISCECTOMY  1996    VARICOSE VEIN SURGERY Bilateral 2010    WISDOM TOOTH EXTRACTION       Allergies: No Known Allergies   Diagnostic exams (per chart review): Impression (before surgery)  1.  Lumbar spine degenerative changes are worst at L2-L3 and L3-L4, where there is severe spinal canal stenosis at both levels with associated cauda equina nerve root redundancy.  2.  Lumbar spine

## 2025-07-17 NOTE — PROGRESS NOTES
Patient presents today for physical therapy at our location with LINO Randhawa.  I was asked to see the patient today given some redness around the proximal aspect of his surgical incision.  Surgical incision was noted to be very well-healed at the last appointment.  He denies any drainage, pain, headaches, fevers or other infectious symptoms.  The patient thinks it became red a few days ago.  He continues to note resolution in his lower extremity radicular symptoms.  He feels like things are also going well with physical therapy.    On exam his incision is largely healed but at the proximal most aspect, there is an area of approximately half a centimeter that is red and swollen with a small amount of thin eschar.  Gentle palpation of this area resulted in the expression of a small amount of purulent blood.  Otherwise the incision is healthy appearing.  There is no obvious palpable fascial defect noted along the remainder of the incision.    We will start the patient on oral antibiotics.  He will keep the area clean and dry with dry gauze and monitor it closely.  He can continue to shower and let warm soapy water run over it with gentle drying of the area afterwards.  Given that he is approximately 1 month out from surgery, seems potentially consistent with a suture abscess at the proximal apex of his incision.  The patient is scheduled for another physical therapy visit next week on 7/22/2025.  I will be in the office at the same location that day and we will plan on evaluating him personally again.  He will also continue with tight glycemic control.  He has had postoperative appointments with his PCP with continued attention paid to tight glycemic control.  I had previously spoken with the patient's PCP prior to surgery and she very graciously offered to see the patient and proximal postoperative period to ensure this.

## 2025-07-22 ENCOUNTER — TREATMENT (OUTPATIENT)
Age: 70
End: 2025-07-22
Payer: MEDICARE

## 2025-07-22 DIAGNOSIS — Z74.09 IMPAIRED FUNCTIONAL MOBILITY, BALANCE, GAIT, AND ENDURANCE: ICD-10-CM

## 2025-07-22 DIAGNOSIS — M54.16 LUMBAR RADICULOPATHY: ICD-10-CM

## 2025-07-22 DIAGNOSIS — M54.50 CHRONIC BILATERAL LOW BACK PAIN WITHOUT SCIATICA: ICD-10-CM

## 2025-07-22 DIAGNOSIS — M62.81 MUSCLE WEAKNESS (GENERALIZED): Primary | ICD-10-CM

## 2025-07-22 DIAGNOSIS — G89.29 CHRONIC BILATERAL LOW BACK PAIN WITHOUT SCIATICA: ICD-10-CM

## 2025-07-22 DIAGNOSIS — R68.89 DECREASED ACTIVITY TOLERANCE: ICD-10-CM

## 2025-07-22 DIAGNOSIS — Z78.9 DECREASED ACTIVITIES OF DAILY LIVING (ADL): ICD-10-CM

## 2025-07-22 PROCEDURE — 97110 THERAPEUTIC EXERCISES: CPT | Performed by: PHYSICAL THERAPIST

## 2025-07-22 NOTE — PROGRESS NOTES
[facility]  [address]  [place] [address]  Dept: [phone]      Physical Therapy Daily Note     Referring MD: Priyank Gray *  Diagnosis:     ICD-10-CM    1. Muscle weakness (generalized)  M62.81       2. Impaired functional mobility, balance, gait, and endurance  Z74.09       3. Chronic bilateral low back pain without sciatica  M54.50     G89.29       4. Decreased activity tolerance  R68.89       5. Lumbar radiculopathy [M54.16]  M54.16       6. Decreased activities of daily living (ADL)  Z78.9          Spine Surgery Date (if applicable):   Prior Spine Surgery: lumbar discectomy in 1996. L2-4 laminectomy on 06/16/2025  Prior Orthopedic Surgeries:   Therapy precautions: Back precautions - No BLT's   Co-morbidities affecting plan of care: DM type 2    Payor: Payor: MEDICARE /  /  /     Billing pattern: Government- total time   Total Timed Procedure Codes: 40 min   Total Time: 40 min    Modifier needed: No  Episode visit count:  5     PERTINENT MEDICAL HISTORY   Medications: reviewed in chart  Past medical and surgical history:   Past Medical History:   Diagnosis Date    Diabetes mellitus (HCC)     oral reliant, avg bs 110, denies s/s hypo    DVT, lower extremity (HCC) 2024    right leg    HLD (hyperlipidemia)     Malaria 1965    JUDIE on CPAP     Osteoarthritis     PONV (postoperative nausea and vomiting)     RLS (restless legs syndrome)     Sarcoidosis       Past Surgical History:   Procedure Laterality Date    COLONOSCOPY      x2    LAMINECTOMY N/A 6/16/2025    L2-L4 lumbar laminectomy performed by Priyank Gray Jr., MD at [facility] MAIN OR    LIPOMA RESECTION      on back    LUMBAR DISCECTOMY  1996    VARICOSE VEIN SURGERY Bilateral 2010    WISDOM TOOTH EXTRACTION       Allergies: No Known Allergies   Diagnostic exams (per chart review): Impression (before surgery)  1.  Lumbar spine degenerative changes are worst at L2-L3 and L3-L4, where there is severe spinal

## 2025-07-23 ENCOUNTER — OFFICE VISIT (OUTPATIENT)
Dept: UROLOGY | Age: 70
End: 2025-07-23
Payer: MEDICARE

## 2025-07-23 DIAGNOSIS — N13.8 BPH WITH OBSTRUCTION/LOWER URINARY TRACT SYMPTOMS: ICD-10-CM

## 2025-07-23 DIAGNOSIS — R33.9 URINARY RETENTION: Primary | ICD-10-CM

## 2025-07-23 DIAGNOSIS — N40.1 BPH WITH OBSTRUCTION/LOWER URINARY TRACT SYMPTOMS: ICD-10-CM

## 2025-07-23 LAB
BILIRUBIN, URINE, POC: NEGATIVE
BLOOD URINE, POC: NEGATIVE
GLUCOSE URINE, POC: NEGATIVE MG/DL
KETONES, URINE, POC: NEGATIVE MG/DL
LEUKOCYTE ESTERASE, URINE, POC: NEGATIVE
NITRITE, URINE, POC: NEGATIVE
PH, URINE, POC: 6 (ref 4.6–8)
PROTEIN,URINE, POC: NEGATIVE MG/DL
PVR, POC: 21 CC
SPECIFIC GRAVITY, URINE, POC: 1.03 (ref 1–1.03)
URINALYSIS CLARITY, POC: NORMAL
URINALYSIS COLOR, POC: NORMAL
UROBILINOGEN, POC: NORMAL MG/DL

## 2025-07-23 PROCEDURE — 81003 URINALYSIS AUTO W/O SCOPE: CPT | Performed by: NURSE PRACTITIONER

## 2025-07-23 PROCEDURE — G8417 CALC BMI ABV UP PARAM F/U: HCPCS | Performed by: NURSE PRACTITIONER

## 2025-07-23 PROCEDURE — 1160F RVW MEDS BY RX/DR IN RCRD: CPT | Performed by: NURSE PRACTITIONER

## 2025-07-23 PROCEDURE — 1036F TOBACCO NON-USER: CPT | Performed by: NURSE PRACTITIONER

## 2025-07-23 PROCEDURE — G8427 DOCREV CUR MEDS BY ELIG CLIN: HCPCS | Performed by: NURSE PRACTITIONER

## 2025-07-23 PROCEDURE — 99214 OFFICE O/P EST MOD 30 MIN: CPT | Performed by: NURSE PRACTITIONER

## 2025-07-23 PROCEDURE — 3017F COLORECTAL CA SCREEN DOC REV: CPT | Performed by: NURSE PRACTITIONER

## 2025-07-23 PROCEDURE — 51798 US URINE CAPACITY MEASURE: CPT | Performed by: NURSE PRACTITIONER

## 2025-07-23 PROCEDURE — 1123F ACP DISCUSS/DSCN MKR DOCD: CPT | Performed by: NURSE PRACTITIONER

## 2025-07-23 PROCEDURE — 1159F MED LIST DOCD IN RCRD: CPT | Performed by: NURSE PRACTITIONER

## 2025-07-23 ASSESSMENT — ENCOUNTER SYMPTOMS
BACK PAIN: 0
NAUSEA: 0

## 2025-07-23 NOTE — PROGRESS NOTES
place, and time  Neck - supple, no significant adenopathy  Chest/Lung-  Quiet, even and easy respiratory effort without use of accessory muscles  Skin - normal coloration and turgor, no rashes      Assessment and Plan    ICD-10-CM    1. Urinary retention  R33.9 AMB POC URINALYSIS DIP STICK AUTO W/O MICRO     AMB POC PVR, JOVANI,POST-VOID RES,US,NON-IMAGING      2. BPH with obstruction/lower urinary tract symptoms  N40.1 AMB POC URINALYSIS DIP STICK AUTO W/O MICRO    N13.8 AMB POC PVR, JOVANI,POST-VOID RES,US,NON-IMAGING          BPH/LUTS-Urinary retention- urine normal. PVR normal. Stop flomax 0.4 mg PO daily. May consider starting this prior to future surgeries. May f/u PRN.     ARSENIO Kenney - CNP  Dr. Gastelum is supervising physician today and he approves plan of care.

## 2025-07-24 ENCOUNTER — TREATMENT (OUTPATIENT)
Age: 70
End: 2025-07-24
Payer: MEDICARE

## 2025-07-24 DIAGNOSIS — Z78.9 DECREASED ACTIVITIES OF DAILY LIVING (ADL): ICD-10-CM

## 2025-07-24 DIAGNOSIS — M54.50 CHRONIC BILATERAL LOW BACK PAIN WITHOUT SCIATICA: ICD-10-CM

## 2025-07-24 DIAGNOSIS — Z74.09 IMPAIRED FUNCTIONAL MOBILITY, BALANCE, GAIT, AND ENDURANCE: ICD-10-CM

## 2025-07-24 DIAGNOSIS — R68.89 DECREASED ACTIVITY TOLERANCE: ICD-10-CM

## 2025-07-24 DIAGNOSIS — M62.81 MUSCLE WEAKNESS (GENERALIZED): Primary | ICD-10-CM

## 2025-07-24 DIAGNOSIS — G89.29 CHRONIC BILATERAL LOW BACK PAIN WITHOUT SCIATICA: ICD-10-CM

## 2025-07-24 PROCEDURE — 97110 THERAPEUTIC EXERCISES: CPT | Performed by: PHYSICAL THERAPIST

## 2025-07-24 NOTE — PROGRESS NOTES
[facility]  [address] [facility] [facility]  [place] [address]  Dept: [phone]      Physical Therapy Daily Note     Referring MD: Priyank Gray *  Diagnosis:     ICD-10-CM    1. Muscle weakness (generalized)  M62.81       2. Impaired functional mobility, balance, gait, and endurance  Z74.09       3. Chronic bilateral low back pain without sciatica  M54.50     G89.29       4. Decreased activities of daily living (ADL)  Z78.9       5. Decreased activity tolerance  R68.89            Spine Surgery Date (if applicable):   Prior Spine Surgery: lumbar discectomy in 1996. L2-4 laminectomy on 06/16/2025  Prior Orthopedic Surgeries:   Therapy precautions: Back precautions - No BLT's   Co-morbidities affecting plan of care: DM type 2    Payor: Payor: MEDICARE /  /  /     Billing pattern: Government- total time   Total Timed Procedure Codes: 40 min   Total Time: 40 min    Modifier needed: No  Episode visit count:  6     PERTINENT MEDICAL HISTORY   Medications: reviewed in chart  Past medical and surgical history:   Past Medical History:   Diagnosis Date    Diabetes mellitus (HCC)     oral reliant, avg bs 110, denies s/s hypo    DVT, lower extremity (HCC) 2024    right leg    HLD (hyperlipidemia)     Malaria 1965    JUDIE on CPAP     Osteoarthritis     PONV (postoperative nausea and vomiting)     RLS (restless legs syndrome)     Sarcoidosis       Past Surgical History:   Procedure Laterality Date    COLONOSCOPY      x2    LAMINECTOMY N/A 6/16/2025    L2-L4 lumbar laminectomy performed by Priyank Gray Jr., MD at [facility] MAIN OR    LIPOMA RESECTION      on back    LUMBAR DISCECTOMY  1996    VARICOSE VEIN SURGERY Bilateral 2010    WISDOM TOOTH EXTRACTION       Allergies: No Known Allergies   Diagnostic exams (per chart review): Impression (before surgery)  1.  Lumbar spine degenerative changes are worst at L2-L3 and L3-L4, where there is severe spinal canal stenosis at both levels with associated

## 2025-07-28 ENCOUNTER — CLINICAL DOCUMENTATION (OUTPATIENT)
Age: 70
End: 2025-07-28

## 2025-07-28 ENCOUNTER — TREATMENT (OUTPATIENT)
Age: 70
End: 2025-07-28
Payer: MEDICARE

## 2025-07-28 DIAGNOSIS — R68.89 DECREASED ACTIVITY TOLERANCE: ICD-10-CM

## 2025-07-28 DIAGNOSIS — Z74.09 IMPAIRED FUNCTIONAL MOBILITY, BALANCE, GAIT, AND ENDURANCE: ICD-10-CM

## 2025-07-28 DIAGNOSIS — M54.50 CHRONIC BILATERAL LOW BACK PAIN WITHOUT SCIATICA: ICD-10-CM

## 2025-07-28 DIAGNOSIS — Z78.9 DECREASED ACTIVITIES OF DAILY LIVING (ADL): ICD-10-CM

## 2025-07-28 DIAGNOSIS — M62.81 MUSCLE WEAKNESS (GENERALIZED): Primary | ICD-10-CM

## 2025-07-28 DIAGNOSIS — M54.16 LUMBAR RADICULOPATHY: ICD-10-CM

## 2025-07-28 DIAGNOSIS — G89.29 CHRONIC BILATERAL LOW BACK PAIN WITHOUT SCIATICA: ICD-10-CM

## 2025-07-28 PROCEDURE — 97110 THERAPEUTIC EXERCISES: CPT | Performed by: PHYSICAL THERAPIST

## 2025-07-28 NOTE — PROGRESS NOTES
[facility]  [address]  [place] [address]  Dept: [phone]      Physical Therapy Daily Note     Referring MD: Priyank Gray *  Diagnosis:     ICD-10-CM    1. Muscle weakness (generalized)  M62.81       2. Impaired functional mobility, balance, gait, and endurance  Z74.09       3. Chronic bilateral low back pain without sciatica  M54.50     G89.29       4. Decreased activities of daily living (ADL)  Z78.9       5. Decreased activity tolerance  R68.89       6. Lumbar radiculopathy [M54.16]  M54.16          Spine Surgery Date (if applicable):   Prior Spine Surgery: lumbar discectomy in 1996. L2-4 laminectomy on 06/16/2025  Prior Orthopedic Surgeries:   Therapy precautions: Back precautions - No BLT's   Co-morbidities affecting plan of care: DM type 2    Payor: Payor: MEDICARE /  /  /     Billing pattern: Government- total time   Total Timed Procedure Codes: 40 min   Total Time: 40 min    Modifier needed: No  Episode visit count:  7     PERTINENT MEDICAL HISTORY   Medications: reviewed in chart  Past medical and surgical history:   Past Medical History:   Diagnosis Date    Diabetes mellitus (HCC)     oral reliant, avg bs 110, denies s/s hypo    DVT, lower extremity (HCC) 2024    right leg    HLD (hyperlipidemia)     Malaria 1965    JUDIE on CPAP     Osteoarthritis     PONV (postoperative nausea and vomiting)     RLS (restless legs syndrome)     Sarcoidosis       Past Surgical History:   Procedure Laterality Date    COLONOSCOPY      x2    LAMINECTOMY N/A 6/16/2025    L2-L4 lumbar laminectomy performed by Priyank Gray Jr., MD at [facility] MAIN OR    LIPOMA RESECTION      on back    LUMBAR DISCECTOMY  1996    VARICOSE VEIN SURGERY Bilateral 2010    WISDOM TOOTH EXTRACTION       Allergies: No Known Allergies   Diagnostic exams (per chart review): Impression (before surgery)  1.  Lumbar spine degenerative changes are worst at L2-L3 and L3-L4, where there is severe spinal

## 2025-07-28 NOTE — PROGRESS NOTES
Patient was noted 2 weeks ago to have a suture abscess at the proximal aspect of his lumbar incision.  This was noted at the patient's physical therapy appointment on July 17, 2025.  At that time he was started on Bactrim and followed serially.     I saw him last week on 7/24/2025 and the wound looked dramatically better with resolution of all redness and just a small area of pinpoint bloody drainage.      Today the patient follows up again for physical therapy and I was present and able to assess his posterior incision.  There is no more bloody drainage, just a small area with healthy pinpoint eschar that is nonerythematous.  Overall the wound continues to look very much improved.  He has been showering and keeping the area covered with dry gauze.  He has only 1 or 2 days left of oral antibiotics.  Encouraged him to continue taking this through completion and also continue with local wound care using dry dressing changes as needed.    I am scheduled see the patient next week for his 6-week postoperative appointment.

## 2025-07-30 ENCOUNTER — TREATMENT (OUTPATIENT)
Age: 70
End: 2025-07-30
Payer: MEDICARE

## 2025-07-30 DIAGNOSIS — G89.29 CHRONIC BILATERAL LOW BACK PAIN WITHOUT SCIATICA: ICD-10-CM

## 2025-07-30 DIAGNOSIS — M54.50 CHRONIC BILATERAL LOW BACK PAIN WITHOUT SCIATICA: ICD-10-CM

## 2025-07-30 DIAGNOSIS — M54.16 LUMBAR RADICULOPATHY: ICD-10-CM

## 2025-07-30 DIAGNOSIS — Z78.9 DECREASED ACTIVITIES OF DAILY LIVING (ADL): ICD-10-CM

## 2025-07-30 DIAGNOSIS — Z74.09 IMPAIRED FUNCTIONAL MOBILITY, BALANCE, GAIT, AND ENDURANCE: ICD-10-CM

## 2025-07-30 DIAGNOSIS — R68.89 DECREASED ACTIVITY TOLERANCE: ICD-10-CM

## 2025-07-30 DIAGNOSIS — M62.81 MUSCLE WEAKNESS (GENERALIZED): Primary | ICD-10-CM

## 2025-07-30 PROCEDURE — 97110 THERAPEUTIC EXERCISES: CPT | Performed by: PHYSICAL THERAPIST

## 2025-07-30 NOTE — PROGRESS NOTES
[facility]  [address] [facility]  [place] [address]  Dept: [phone]      Physical Therapy Daily Note     Referring MD: Priyank Gray *  Diagnosis:     ICD-10-CM    1. Muscle weakness (generalized)  M62.81       2. Decreased activities of daily living (ADL)  Z78.9       3. Impaired functional mobility, balance, gait, and endurance  Z74.09       4. Chronic bilateral low back pain without sciatica  M54.50     G89.29       5. Decreased activity tolerance  R68.89       6. Lumbar radiculopathy [M54.16]  M54.16            Spine Surgery Date (if applicable):   Prior Spine Surgery: lumbar discectomy in 1996. L2-4 laminectomy on 06/16/2025  Prior Orthopedic Surgeries:   Therapy precautions: Back precautions - No BLT's   Co-morbidities affecting plan of care: DM type 2    Payor: Payor: MEDICARE /  /  /     Billing pattern: Government- total time   Total Timed Procedure Codes: 40 min   Total Time: 40 min    Modifier needed: No  Episode visit count:  8     PERTINENT MEDICAL HISTORY   Medications: reviewed in chart  Past medical and surgical history:   Past Medical History:   Diagnosis Date    Diabetes mellitus (HCC)     oral reliant, avg bs 110, denies s/s hypo    DVT, lower extremity (HCC) 2024    right leg    HLD (hyperlipidemia)     Malaria 1965    JUDIE on CPAP     Osteoarthritis     PONV (postoperative nausea and vomiting)     RLS (restless legs syndrome)     Sarcoidosis       Past Surgical History:   Procedure Laterality Date    COLONOSCOPY      x2    LAMINECTOMY N/A 6/16/2025    L2-L4 lumbar laminectomy performed by Priyank Gray Jr., MD at  MAIN OR    LIPOMA RESECTION      on back    LUMBAR DISCECTOMY  1996    VARICOSE VEIN SURGERY Bilateral 2010    WISDOM TOOTH EXTRACTION       Allergies: No Known Allergies   Diagnostic exams (per chart review): Impression (before surgery)  1.  Lumbar spine degenerative changes are worst at L2-L3 and L3-L4, where there is severe spinal

## 2025-08-04 ENCOUNTER — OFFICE VISIT (OUTPATIENT)
Age: 70
End: 2025-08-04

## 2025-08-04 ENCOUNTER — TREATMENT (OUTPATIENT)
Age: 70
End: 2025-08-04
Payer: MEDICARE

## 2025-08-04 DIAGNOSIS — Z78.9 DECREASED ACTIVITIES OF DAILY LIVING (ADL): ICD-10-CM

## 2025-08-04 DIAGNOSIS — R68.89 DECREASED ACTIVITY TOLERANCE: ICD-10-CM

## 2025-08-04 DIAGNOSIS — Z98.890 HISTORY OF LUMBAR LAMINECTOMY: Primary | ICD-10-CM

## 2025-08-04 DIAGNOSIS — M54.16 LUMBAR RADICULOPATHY: ICD-10-CM

## 2025-08-04 DIAGNOSIS — Z74.09 IMPAIRED FUNCTIONAL MOBILITY, BALANCE, GAIT, AND ENDURANCE: ICD-10-CM

## 2025-08-04 DIAGNOSIS — M62.81 MUSCLE WEAKNESS (GENERALIZED): Primary | ICD-10-CM

## 2025-08-04 PROCEDURE — 99024 POSTOP FOLLOW-UP VISIT: CPT | Performed by: ORTHOPAEDIC SURGERY

## 2025-08-04 PROCEDURE — 97110 THERAPEUTIC EXERCISES: CPT | Performed by: PHYSICAL THERAPIST

## 2025-08-06 ENCOUNTER — TREATMENT (OUTPATIENT)
Age: 70
End: 2025-08-06
Payer: MEDICARE

## 2025-08-06 DIAGNOSIS — M62.81 MUSCLE WEAKNESS (GENERALIZED): Primary | ICD-10-CM

## 2025-08-06 DIAGNOSIS — R68.89 DECREASED ACTIVITY TOLERANCE: ICD-10-CM

## 2025-08-06 DIAGNOSIS — M54.16 LUMBAR RADICULOPATHY: ICD-10-CM

## 2025-08-06 DIAGNOSIS — Z74.09 IMPAIRED FUNCTIONAL MOBILITY, BALANCE, GAIT, AND ENDURANCE: ICD-10-CM

## 2025-08-06 DIAGNOSIS — Z78.9 DECREASED ACTIVITIES OF DAILY LIVING (ADL): ICD-10-CM

## 2025-08-06 PROCEDURE — 97110 THERAPEUTIC EXERCISES: CPT | Performed by: PHYSICAL THERAPIST

## 2025-08-11 ENCOUNTER — TREATMENT (OUTPATIENT)
Age: 70
End: 2025-08-11
Payer: MEDICARE

## 2025-08-11 DIAGNOSIS — Z78.9 DECREASED ACTIVITIES OF DAILY LIVING (ADL): ICD-10-CM

## 2025-08-11 DIAGNOSIS — G89.29 CHRONIC BILATERAL LOW BACK PAIN WITHOUT SCIATICA: ICD-10-CM

## 2025-08-11 DIAGNOSIS — M54.50 CHRONIC BILATERAL LOW BACK PAIN WITHOUT SCIATICA: ICD-10-CM

## 2025-08-11 DIAGNOSIS — M54.16 LUMBAR RADICULOPATHY: ICD-10-CM

## 2025-08-11 DIAGNOSIS — R68.89 DECREASED ACTIVITY TOLERANCE: ICD-10-CM

## 2025-08-11 DIAGNOSIS — M62.81 MUSCLE WEAKNESS (GENERALIZED): Primary | ICD-10-CM

## 2025-08-11 PROCEDURE — 97110 THERAPEUTIC EXERCISES: CPT | Performed by: PHYSICAL THERAPIST

## 2025-08-21 ENCOUNTER — TREATMENT (OUTPATIENT)
Age: 70
End: 2025-08-21
Payer: MEDICARE

## 2025-08-21 DIAGNOSIS — R68.89 DECREASED ACTIVITY TOLERANCE: ICD-10-CM

## 2025-08-21 DIAGNOSIS — Z78.9 DECREASED ACTIVITIES OF DAILY LIVING (ADL): ICD-10-CM

## 2025-08-21 DIAGNOSIS — M62.81 MUSCLE WEAKNESS (GENERALIZED): Primary | ICD-10-CM

## 2025-08-21 DIAGNOSIS — M54.41 CHRONIC RIGHT-SIDED LOW BACK PAIN WITH RIGHT-SIDED SCIATICA: ICD-10-CM

## 2025-08-21 DIAGNOSIS — G89.29 CHRONIC RIGHT-SIDED LOW BACK PAIN WITH RIGHT-SIDED SCIATICA: ICD-10-CM

## 2025-08-21 PROCEDURE — 97110 THERAPEUTIC EXERCISES: CPT | Performed by: PHYSICAL THERAPIST

## 2025-08-25 ENCOUNTER — TREATMENT (OUTPATIENT)
Age: 70
End: 2025-08-25

## 2025-08-25 DIAGNOSIS — Z74.09 IMPAIRED FUNCTIONAL MOBILITY, BALANCE, GAIT, AND ENDURANCE: ICD-10-CM

## 2025-08-25 DIAGNOSIS — M62.81 MUSCLE WEAKNESS (GENERALIZED): Primary | ICD-10-CM

## 2025-08-25 DIAGNOSIS — M54.16 LUMBAR RADICULOPATHY: ICD-10-CM

## 2025-08-28 ENCOUNTER — TREATMENT (OUTPATIENT)
Age: 70
End: 2025-08-28

## 2025-08-28 DIAGNOSIS — Z78.9 DECREASED ACTIVITIES OF DAILY LIVING (ADL): ICD-10-CM

## 2025-08-28 DIAGNOSIS — M54.16 LUMBAR RADICULOPATHY: ICD-10-CM

## 2025-08-28 DIAGNOSIS — Z74.09 IMPAIRED FUNCTIONAL MOBILITY, BALANCE, GAIT, AND ENDURANCE: ICD-10-CM

## 2025-08-28 DIAGNOSIS — R68.89 DECREASED ACTIVITY TOLERANCE: ICD-10-CM

## 2025-08-28 DIAGNOSIS — M62.81 MUSCLE WEAKNESS (GENERALIZED): Primary | ICD-10-CM

## 2025-09-05 ENCOUNTER — TREATMENT (OUTPATIENT)
Age: 70
End: 2025-09-05

## 2025-09-05 DIAGNOSIS — M62.81 MUSCLE WEAKNESS (GENERALIZED): Primary | ICD-10-CM

## 2025-09-05 DIAGNOSIS — M54.16 LUMBAR RADICULOPATHY: ICD-10-CM

## 2025-09-05 DIAGNOSIS — Z74.09 IMPAIRED FUNCTIONAL MOBILITY, BALANCE, GAIT, AND ENDURANCE: ICD-10-CM

## (undated) DEVICE — SYRINGE MED 10ML LUERLOCK TIP W/O SFTY DISP

## (undated) DEVICE — 3M™ TEGADERM™ TRANSPARENT FILM DRESSING FRAME STYLE, 1628, 6 IN X 8 IN (15 CM X 20 CM), 10/CT 8CT/CASE: Brand: 3M™ TEGADERM™

## (undated) DEVICE — STANDARD HYPODERMIC NEEDLE,POLYPROPYLENE HUB: Brand: MONOJECT

## (undated) DEVICE — SUTURE MONOCRYL STRATAFIX SPRL + SZ 3-0 L18IN ABSRB UD PS-2 SXMP1B107

## (undated) DEVICE — SUTURE VICRYL SZ 1 L27IN ABSRB UD L36MM CP-1 1/2 CIR REV CUT J268H

## (undated) DEVICE — 3M™ STERI-DRAPE™ INSTRUMENT POUCH 1018: Brand: STERI-DRAPE™

## (undated) DEVICE — LIQUIBAND RAPID ADHESIVE 36/CS 0.8ML: Brand: MEDLINE

## (undated) DEVICE — KIT EVAC 0.13IN RECT TB DIA10FR 400CC PVC 3 SPR Y CONN DRN

## (undated) DEVICE — SUTURE STRATAFIX SYMMETRIC SZ 1 L18IN ABSRB VLT CT1 L36CM SXPP1A404

## (undated) DEVICE — SPONGE,NEURO,1"X3",XR,STRL,LF,10/PK: Brand: MEDLINE

## (undated) DEVICE — TUBING, SUCTION, 1/4" X 10', STRAIGHT: Brand: MEDLINE

## (undated) DEVICE — SUTURE VICRYL + SZ 3-0 L18IN ABSRB UD SH 1/2 CIR TAPERCUT NDL VCP864D

## (undated) DEVICE — SOLUTION IRRIG 1000ML H2O PIC PLAS SHATTERPROOF CONTAINER

## (undated) DEVICE — POSTERIOR LAMI VANPLT-LUCAS: Brand: MEDLINE INDUSTRIES, INC.

## (undated) DEVICE — SUTURE VICRYL PLUS SZ 0 8X18IN MO-4 VCP701D

## (undated) DEVICE — AGENT HEMOSTATIC SURGIFLOW MATRIX KIT W/THROMBIN

## (undated) DEVICE — ABSORBENT, WATERPROOF, BACTERIA PROOF FILM DRESSING: Brand: OPSITE POST OP 9.5X8.5CM CTN 20

## (undated) DEVICE — SPONGE,NEURO,0.5"X0.5",XR,STRL,10/PK: Brand: MEDLINE

## (undated) DEVICE — SUTURE ABSORBABLE L 18 IN SZ 2-0 NDL L 26 MM TRICLOSAN

## (undated) DEVICE — SOLUTION IRRIG 1000ML 09% SOD CHL USP PIC PLAS CONTAINER

## (undated) DEVICE — 3M™ TEGADERM™ TRANSPARENT FILM DRESSING FRAME STYLE, 1624W, 2-3/8 IN X 2-3/4 IN (6 CM X 7 CM), 100/CT 4CT/CASE: Brand: 3M™ TEGADERM™

## (undated) DEVICE — BIPOLAR SEALER 23-112-1 AQM 6.0: Brand: AQUAMANTYS ®

## (undated) DEVICE — SUTURE NRLN SZ 4-0 L18IN NONABSORBABLE BLK L13MM TF 1/2 CIR C584D

## (undated) DEVICE — FORCEPS BPLR L210MM TIP L1.5 WRK L105MM STR BAYNT INSUL DISP